# Patient Record
Sex: FEMALE | Race: WHITE | Employment: OTHER | URBAN - METROPOLITAN AREA
[De-identification: names, ages, dates, MRNs, and addresses within clinical notes are randomized per-mention and may not be internally consistent; named-entity substitution may affect disease eponyms.]

---

## 2023-10-02 ENCOUNTER — HOSPITAL ENCOUNTER (INPATIENT)
Age: 63
LOS: 7 days | Discharge: NURSING, REHAB OR LONG-TERM CARE | DRG: 045 | End: 2023-10-09
Attending: EMERGENCY MEDICINE | Admitting: FAMILY MEDICINE
Payer: MEDICAID

## 2023-10-02 ENCOUNTER — APPOINTMENT (OUTPATIENT)
Dept: CT IMAGING | Age: 63
DRG: 045 | End: 2023-10-02
Payer: MEDICAID

## 2023-10-02 ENCOUNTER — APPOINTMENT (OUTPATIENT)
Dept: GENERAL RADIOLOGY | Age: 63
DRG: 045 | End: 2023-10-02
Payer: MEDICAID

## 2023-10-02 DIAGNOSIS — R27.0 ATAXIA: Primary | ICD-10-CM

## 2023-10-02 LAB
ALBUMIN SERPL-MCNC: 3.7 G/DL (ref 3.5–5.2)
ALP SERPL-CCNC: 74 U/L (ref 35–104)
ALT SERPL-CCNC: 14 U/L (ref 0–32)
ANION GAP SERPL CALCULATED.3IONS-SCNC: 16 MMOL/L (ref 7–16)
AST SERPL-CCNC: 19 U/L (ref 0–31)
BASOPHILS # BLD: 0 K/UL (ref 0–0.2)
BASOPHILS NFR BLD: 0 % (ref 0–2)
BILIRUB SERPL-MCNC: 0.7 MG/DL (ref 0–1.2)
BUN SERPL-MCNC: 29 MG/DL (ref 6–23)
CALCIUM SERPL-MCNC: 9.2 MG/DL (ref 8.6–10.2)
CHLORIDE SERPL-SCNC: 103 MMOL/L (ref 98–107)
CO2 SERPL-SCNC: 22 MMOL/L (ref 22–29)
CREAT SERPL-MCNC: 1.6 MG/DL (ref 0.5–1)
EOSINOPHIL # BLD: 0.13 K/UL (ref 0.05–0.5)
EOSINOPHILS RELATIVE PERCENT: 1 % (ref 0–6)
ERYTHROCYTE [DISTWIDTH] IN BLOOD BY AUTOMATED COUNT: 12 % (ref 11.5–15)
GFR SERPL CREATININE-BSD FRML MDRD: 36 ML/MIN/1.73M2
GLUCOSE BLD-MCNC: 339 MG/DL (ref 74–99)
GLUCOSE BLD-MCNC: 342 MG/DL (ref 74–99)
GLUCOSE SERPL-MCNC: 334 MG/DL (ref 74–99)
HBA1C MFR BLD: 7.6 % (ref 4–5.6)
HCT VFR BLD AUTO: 37 % (ref 34–48)
HGB BLD-MCNC: 13.1 G/DL (ref 11.5–15.5)
INR PPP: 1.1
LYMPHOCYTES NFR BLD: 2.05 K/UL (ref 1.5–4)
LYMPHOCYTES RELATIVE PERCENT: 14 % (ref 20–42)
MCH RBC QN AUTO: 39.1 PG (ref 26–35)
MCHC RBC AUTO-ENTMCNC: 35.4 G/DL (ref 32–34.5)
MCV RBC AUTO: 110.4 FL (ref 80–99.9)
MONOCYTES NFR BLD: 0.64 K/UL (ref 0.1–0.95)
MONOCYTES NFR BLD: 4 % (ref 2–12)
NEUTROPHILS NFR BLD: 81 % (ref 43–80)
NEUTS SEG NFR BLD: 11.89 K/UL (ref 1.8–7.3)
PARTIAL THROMBOPLASTIN TIME: 25.1 SEC (ref 24.5–35.1)
PLATELET # BLD AUTO: 252 K/UL (ref 130–450)
PMV BLD AUTO: 11.6 FL (ref 7–12)
POTASSIUM SERPL-SCNC: 3.7 MMOL/L (ref 3.5–5)
PROT SERPL-MCNC: 6.4 G/DL (ref 6.4–8.3)
PROTHROMBIN TIME: 11.5 SEC (ref 9.3–12.4)
RBC # BLD AUTO: 3.35 M/UL (ref 3.5–5.5)
RBC # BLD: ABNORMAL 10*6/UL
SODIUM SERPL-SCNC: 141 MMOL/L (ref 132–146)
TROPONIN I SERPL HS-MCNC: 27 NG/L (ref 0–9)
WBC OTHER # BLD: 14.7 K/UL (ref 4.5–11.5)

## 2023-10-02 PROCEDURE — 70450 CT HEAD/BRAIN W/O DYE: CPT

## 2023-10-02 PROCEDURE — 2580000003 HC RX 258: Performed by: EMERGENCY MEDICINE

## 2023-10-02 PROCEDURE — 84484 ASSAY OF TROPONIN QUANT: CPT

## 2023-10-02 PROCEDURE — 6370000000 HC RX 637 (ALT 250 FOR IP): Performed by: FAMILY MEDICINE

## 2023-10-02 PROCEDURE — 99285 EMERGENCY DEPT VISIT HI MDM: CPT

## 2023-10-02 PROCEDURE — 2060000000 HC ICU INTERMEDIATE R&B

## 2023-10-02 PROCEDURE — 85610 PROTHROMBIN TIME: CPT

## 2023-10-02 PROCEDURE — 85025 COMPLETE CBC W/AUTO DIFF WBC: CPT

## 2023-10-02 PROCEDURE — 93005 ELECTROCARDIOGRAM TRACING: CPT | Performed by: EMERGENCY MEDICINE

## 2023-10-02 PROCEDURE — 80053 COMPREHEN METABOLIC PANEL: CPT

## 2023-10-02 PROCEDURE — 83036 HEMOGLOBIN GLYCOSYLATED A1C: CPT

## 2023-10-02 PROCEDURE — 85730 THROMBOPLASTIN TIME PARTIAL: CPT

## 2023-10-02 PROCEDURE — 71045 X-RAY EXAM CHEST 1 VIEW: CPT

## 2023-10-02 PROCEDURE — 82962 GLUCOSE BLOOD TEST: CPT

## 2023-10-02 RX ORDER — 0.9 % SODIUM CHLORIDE 0.9 %
1000 INTRAVENOUS SOLUTION INTRAVENOUS ONCE
Status: COMPLETED | OUTPATIENT
Start: 2023-10-02 | End: 2023-10-02

## 2023-10-02 RX ORDER — ONDANSETRON 4 MG/1
4 TABLET, ORALLY DISINTEGRATING ORAL EVERY 8 HOURS PRN
Status: DISCONTINUED | OUTPATIENT
Start: 2023-10-02 | End: 2023-10-09 | Stop reason: HOSPADM

## 2023-10-02 RX ORDER — DEXTROSE MONOHYDRATE 100 MG/ML
INJECTION, SOLUTION INTRAVENOUS CONTINUOUS PRN
Status: DISCONTINUED | OUTPATIENT
Start: 2023-10-02 | End: 2023-10-09 | Stop reason: HOSPADM

## 2023-10-02 RX ORDER — ASPIRIN 300 MG/1
300 SUPPOSITORY RECTAL DAILY
Status: DISCONTINUED | OUTPATIENT
Start: 2023-10-03 | End: 2023-10-09 | Stop reason: HOSPADM

## 2023-10-02 RX ORDER — INSULIN GLARGINE 100 [IU]/ML
15 INJECTION, SOLUTION SUBCUTANEOUS NIGHTLY
Status: DISCONTINUED | OUTPATIENT
Start: 2023-10-02 | End: 2023-10-04

## 2023-10-02 RX ORDER — INSULIN LISPRO 100 [IU]/ML
0-4 INJECTION, SOLUTION INTRAVENOUS; SUBCUTANEOUS NIGHTLY
Status: DISCONTINUED | OUTPATIENT
Start: 2023-10-02 | End: 2023-10-09 | Stop reason: HOSPADM

## 2023-10-02 RX ORDER — SODIUM CHLORIDE 9 MG/ML
INJECTION, SOLUTION INTRAVENOUS PRN
Status: DISCONTINUED | OUTPATIENT
Start: 2023-10-02 | End: 2023-10-09 | Stop reason: HOSPADM

## 2023-10-02 RX ORDER — POLYETHYLENE GLYCOL 3350 17 G/17G
17 POWDER, FOR SOLUTION ORAL DAILY PRN
Status: DISCONTINUED | OUTPATIENT
Start: 2023-10-02 | End: 2023-10-09 | Stop reason: HOSPADM

## 2023-10-02 RX ORDER — SODIUM CHLORIDE 9 MG/ML
INJECTION, SOLUTION INTRAVENOUS CONTINUOUS
Status: DISCONTINUED | OUTPATIENT
Start: 2023-10-02 | End: 2023-10-06

## 2023-10-02 RX ORDER — ROSUVASTATIN CALCIUM 20 MG/1
40 TABLET, COATED ORAL NIGHTLY
Status: DISCONTINUED | OUTPATIENT
Start: 2023-10-02 | End: 2023-10-09 | Stop reason: HOSPADM

## 2023-10-02 RX ORDER — ASPIRIN 81 MG/1
81 TABLET, CHEWABLE ORAL DAILY
Status: DISCONTINUED | OUTPATIENT
Start: 2023-10-03 | End: 2023-10-09 | Stop reason: HOSPADM

## 2023-10-02 RX ORDER — ENOXAPARIN SODIUM 100 MG/ML
40 INJECTION SUBCUTANEOUS DAILY
Status: DISCONTINUED | OUTPATIENT
Start: 2023-10-03 | End: 2023-10-09 | Stop reason: HOSPADM

## 2023-10-02 RX ORDER — ONDANSETRON 2 MG/ML
4 INJECTION INTRAMUSCULAR; INTRAVENOUS EVERY 6 HOURS PRN
Status: DISCONTINUED | OUTPATIENT
Start: 2023-10-02 | End: 2023-10-09 | Stop reason: HOSPADM

## 2023-10-02 RX ORDER — INSULIN LISPRO 100 [IU]/ML
0-8 INJECTION, SOLUTION INTRAVENOUS; SUBCUTANEOUS
Status: DISCONTINUED | OUTPATIENT
Start: 2023-10-03 | End: 2023-10-09 | Stop reason: HOSPADM

## 2023-10-02 RX ORDER — SODIUM CHLORIDE 0.9 % (FLUSH) 0.9 %
5-40 SYRINGE (ML) INJECTION EVERY 12 HOURS SCHEDULED
Status: DISCONTINUED | OUTPATIENT
Start: 2023-10-02 | End: 2023-10-09 | Stop reason: HOSPADM

## 2023-10-02 RX ORDER — SODIUM CHLORIDE 0.9 % (FLUSH) 0.9 %
5-40 SYRINGE (ML) INJECTION PRN
Status: DISCONTINUED | OUTPATIENT
Start: 2023-10-02 | End: 2023-10-09 | Stop reason: HOSPADM

## 2023-10-02 RX ORDER — SODIUM CHLORIDE 9 MG/ML
INJECTION, SOLUTION INTRAVENOUS CONTINUOUS
Status: DISCONTINUED | OUTPATIENT
Start: 2023-10-02 | End: 2023-10-02 | Stop reason: SDUPTHER

## 2023-10-02 RX ADMIN — SODIUM CHLORIDE: 9 INJECTION, SOLUTION INTRAVENOUS at 21:08

## 2023-10-02 RX ADMIN — SODIUM CHLORIDE 1000 ML: 9 INJECTION, SOLUTION INTRAVENOUS at 18:05

## 2023-10-02 RX ADMIN — ROSUVASTATIN 40 MG: 20 TABLET, FILM COATED ORAL at 23:05

## 2023-10-02 RX ADMIN — INSULIN LISPRO 4 UNITS: 100 INJECTION, SOLUTION INTRAVENOUS; SUBCUTANEOUS at 23:07

## 2023-10-02 ASSESSMENT — LIFESTYLE VARIABLES
HOW MANY STANDARD DRINKS CONTAINING ALCOHOL DO YOU HAVE ON A TYPICAL DAY: PATIENT DOES NOT DRINK
HOW OFTEN DO YOU HAVE A DRINK CONTAINING ALCOHOL: NEVER

## 2023-10-02 ASSESSMENT — PAIN - FUNCTIONAL ASSESSMENT: PAIN_FUNCTIONAL_ASSESSMENT: NONE - DENIES PAIN

## 2023-10-03 LAB
ANION GAP SERPL CALCULATED.3IONS-SCNC: 12 MMOL/L (ref 7–16)
BACTERIA URNS QL MICRO: ABNORMAL
BILIRUB UR QL STRIP: NEGATIVE
BUN SERPL-MCNC: 27 MG/DL (ref 6–23)
CALCIUM SERPL-MCNC: 8.8 MG/DL (ref 8.6–10.2)
CASTS #/AREA URNS LPF: ABNORMAL /LPF
CHLORIDE SERPL-SCNC: 104 MMOL/L (ref 98–107)
CHOLEST SERPL-MCNC: 198 MG/DL
CLARITY UR: ABNORMAL
CO2 SERPL-SCNC: 24 MMOL/L (ref 22–29)
COLOR UR: YELLOW
CREAT SERPL-MCNC: 1.2 MG/DL (ref 0.5–1)
EKG ATRIAL RATE: 61 BPM
EKG P AXIS: 33 DEGREES
EKG P-R INTERVAL: 158 MS
EKG Q-T INTERVAL: 460 MS
EKG QRS DURATION: 78 MS
EKG QTC CALCULATION (BAZETT): 463 MS
EKG R AXIS: -2 DEGREES
EKG T AXIS: 153 DEGREES
EKG VENTRICULAR RATE: 61 BPM
ERYTHROCYTE [DISTWIDTH] IN BLOOD BY AUTOMATED COUNT: 11.9 % (ref 11.5–15)
FOLATE SERPL-MCNC: >20 NG/ML (ref 4.8–24.2)
GFR SERPL CREATININE-BSD FRML MDRD: 50 ML/MIN/1.73M2
GLUCOSE BLD-MCNC: 198 MG/DL (ref 74–99)
GLUCOSE BLD-MCNC: 214 MG/DL (ref 74–99)
GLUCOSE BLD-MCNC: 214 MG/DL (ref 74–99)
GLUCOSE BLD-MCNC: 275 MG/DL (ref 74–99)
GLUCOSE SERPL-MCNC: 205 MG/DL (ref 74–99)
GLUCOSE UR STRIP-MCNC: 500 MG/DL
HBA1C MFR BLD: 7.6 % (ref 4–5.6)
HCT VFR BLD AUTO: 35.6 % (ref 34–48)
HDLC SERPL-MCNC: 29 MG/DL
HGB BLD-MCNC: 12.4 G/DL (ref 11.5–15.5)
HGB UR QL STRIP.AUTO: NEGATIVE
KETONES UR STRIP-MCNC: NEGATIVE MG/DL
LDLC SERPL CALC-MCNC: 90 MG/DL
LEUKOCYTE ESTERASE UR QL STRIP: NEGATIVE
MCH RBC QN AUTO: 38.9 PG (ref 26–35)
MCHC RBC AUTO-ENTMCNC: 34.8 G/DL (ref 32–34.5)
MCV RBC AUTO: 111.6 FL (ref 80–99.9)
NITRITE UR QL STRIP: NEGATIVE
PH UR STRIP: 5.5 [PH] (ref 5–9)
PLATELET # BLD AUTO: 190 K/UL (ref 130–450)
PMV BLD AUTO: 11 FL (ref 7–12)
POTASSIUM SERPL-SCNC: 3.5 MMOL/L (ref 3.5–5)
PROT UR STRIP-MCNC: ABNORMAL MG/DL
RBC # BLD AUTO: 3.19 M/UL (ref 3.5–5.5)
RBC #/AREA URNS HPF: ABNORMAL /HPF
SODIUM SERPL-SCNC: 140 MMOL/L (ref 132–146)
SP GR UR STRIP: >1.03 (ref 1–1.03)
TRIGL SERPL-MCNC: 397 MG/DL
TSH SERPL DL<=0.05 MIU/L-ACNC: 1.75 UIU/ML (ref 0.27–4.2)
UROBILINOGEN UR STRIP-ACNC: 0.2 EU/DL (ref 0–1)
VIT B12 SERPL-MCNC: <150 PG/ML (ref 211–946)
VLDLC SERPL CALC-MCNC: 79 MG/DL
WBC #/AREA URNS HPF: ABNORMAL /HPF
WBC OTHER # BLD: 11.5 K/UL (ref 4.5–11.5)

## 2023-10-03 PROCEDURE — 99222 1ST HOSP IP/OBS MODERATE 55: CPT | Performed by: PSYCHIATRY & NEUROLOGY

## 2023-10-03 PROCEDURE — 2580000003 HC RX 258: Performed by: STUDENT IN AN ORGANIZED HEALTH CARE EDUCATION/TRAINING PROGRAM

## 2023-10-03 PROCEDURE — 93010 ELECTROCARDIOGRAM REPORT: CPT | Performed by: INTERNAL MEDICINE

## 2023-10-03 PROCEDURE — 81001 URINALYSIS AUTO W/SCOPE: CPT

## 2023-10-03 PROCEDURE — 97161 PT EVAL LOW COMPLEX 20 MIN: CPT

## 2023-10-03 PROCEDURE — 83036 HEMOGLOBIN GLYCOSYLATED A1C: CPT

## 2023-10-03 PROCEDURE — 97530 THERAPEUTIC ACTIVITIES: CPT

## 2023-10-03 PROCEDURE — 6370000000 HC RX 637 (ALT 250 FOR IP): Performed by: FAMILY MEDICINE

## 2023-10-03 PROCEDURE — 82746 ASSAY OF FOLIC ACID SERUM: CPT

## 2023-10-03 PROCEDURE — 82962 GLUCOSE BLOOD TEST: CPT

## 2023-10-03 PROCEDURE — 80048 BASIC METABOLIC PNL TOTAL CA: CPT

## 2023-10-03 PROCEDURE — 97535 SELF CARE MNGMENT TRAINING: CPT

## 2023-10-03 PROCEDURE — 2580000003 HC RX 258: Performed by: FAMILY MEDICINE

## 2023-10-03 PROCEDURE — 2060000000 HC ICU INTERMEDIATE R&B

## 2023-10-03 PROCEDURE — 6360000002 HC RX W HCPCS: Performed by: STUDENT IN AN ORGANIZED HEALTH CARE EDUCATION/TRAINING PROGRAM

## 2023-10-03 PROCEDURE — 82607 VITAMIN B-12: CPT

## 2023-10-03 PROCEDURE — 85027 COMPLETE CBC AUTOMATED: CPT

## 2023-10-03 PROCEDURE — 93306 TTE W/DOPPLER COMPLETE: CPT

## 2023-10-03 PROCEDURE — 80061 LIPID PANEL: CPT

## 2023-10-03 PROCEDURE — 36415 COLL VENOUS BLD VENIPUNCTURE: CPT

## 2023-10-03 PROCEDURE — 97165 OT EVAL LOW COMPLEX 30 MIN: CPT

## 2023-10-03 PROCEDURE — 6370000000 HC RX 637 (ALT 250 FOR IP): Performed by: STUDENT IN AN ORGANIZED HEALTH CARE EDUCATION/TRAINING PROGRAM

## 2023-10-03 PROCEDURE — 84443 ASSAY THYROID STIM HORMONE: CPT

## 2023-10-03 PROCEDURE — 6360000002 HC RX W HCPCS: Performed by: FAMILY MEDICINE

## 2023-10-03 RX ORDER — HYDRALAZINE HYDROCHLORIDE 20 MG/ML
10 INJECTION INTRAMUSCULAR; INTRAVENOUS EVERY 4 HOURS PRN
Status: DISCONTINUED | OUTPATIENT
Start: 2023-10-03 | End: 2023-10-09 | Stop reason: HOSPADM

## 2023-10-03 RX ORDER — ACETAMINOPHEN 325 MG/1
650 TABLET ORAL EVERY 6 HOURS PRN
COMMUNITY

## 2023-10-03 RX ORDER — LOSARTAN POTASSIUM 25 MG/1
25 TABLET ORAL DAILY
Status: DISCONTINUED | OUTPATIENT
Start: 2023-10-03 | End: 2023-10-06

## 2023-10-03 RX ORDER — INSULIN LISPRO 100 [IU]/ML
5 INJECTION, SOLUTION INTRAVENOUS; SUBCUTANEOUS
Status: DISCONTINUED | OUTPATIENT
Start: 2023-10-03 | End: 2023-10-04

## 2023-10-03 RX ORDER — LABETALOL HYDROCHLORIDE 5 MG/ML
10 INJECTION, SOLUTION INTRAVENOUS EVERY 4 HOURS
Status: DISCONTINUED | OUTPATIENT
Start: 2023-10-03 | End: 2023-10-09

## 2023-10-03 RX ADMIN — LOSARTAN POTASSIUM 25 MG: 50 TABLET, FILM COATED ORAL at 08:58

## 2023-10-03 RX ADMIN — ASPIRIN 81 MG: 81 TABLET, CHEWABLE ORAL at 07:56

## 2023-10-03 RX ADMIN — INSULIN LISPRO 5 UNITS: 100 INJECTION, SOLUTION INTRAVENOUS; SUBCUTANEOUS at 18:44

## 2023-10-03 RX ADMIN — LABETALOL HYDROCHLORIDE 10 MG: 5 INJECTION INTRAVENOUS at 08:53

## 2023-10-03 RX ADMIN — ROSUVASTATIN 40 MG: 20 TABLET, FILM COATED ORAL at 20:31

## 2023-10-03 RX ADMIN — INSULIN LISPRO 2 UNITS: 100 INJECTION, SOLUTION INTRAVENOUS; SUBCUTANEOUS at 11:16

## 2023-10-03 RX ADMIN — SODIUM CHLORIDE: 9 INJECTION, SOLUTION INTRAVENOUS at 00:00

## 2023-10-03 RX ADMIN — SODIUM CHLORIDE: 9 INJECTION, SOLUTION INTRAVENOUS at 19:13

## 2023-10-03 RX ADMIN — ENOXAPARIN SODIUM 40 MG: 100 INJECTION SUBCUTANEOUS at 07:56

## 2023-10-03 RX ADMIN — HYDRALAZINE HYDROCHLORIDE 10 MG: 20 INJECTION, SOLUTION INTRAMUSCULAR; INTRAVENOUS at 06:47

## 2023-10-03 RX ADMIN — INSULIN LISPRO 5 UNITS: 100 INJECTION, SOLUTION INTRAVENOUS; SUBCUTANEOUS at 11:17

## 2023-10-03 RX ADMIN — WATER 1000 MG: 1 INJECTION INTRAMUSCULAR; INTRAVENOUS; SUBCUTANEOUS at 08:53

## 2023-10-03 RX ADMIN — INSULIN LISPRO 5 UNITS: 100 INJECTION, SOLUTION INTRAVENOUS; SUBCUTANEOUS at 08:53

## 2023-10-03 RX ADMIN — SODIUM CHLORIDE: 9 INJECTION, SOLUTION INTRAVENOUS at 07:56

## 2023-10-03 RX ADMIN — INSULIN GLARGINE 15 UNITS: 100 INJECTION, SOLUTION SUBCUTANEOUS at 20:31

## 2023-10-03 RX ADMIN — INSULIN LISPRO 2 UNITS: 100 INJECTION, SOLUTION INTRAVENOUS; SUBCUTANEOUS at 07:56

## 2023-10-03 ASSESSMENT — PAIN SCALES - GENERAL
PAINLEVEL_OUTOF10: 0

## 2023-10-03 NOTE — H&P
History & Physical      PCP: No primary care provider on file. Date of Admission: 10/2/2023    Date of Service: Pt seen/examined on 10/2/2023 and is     admitted to Inpatient with expected LOS greater than two midnights due to medical therapy. Placed in Observation. Chief Complaint:  had concerns including Extremity Weakness (BLE weakness, gait disturbances x 2 months. Family states she is also slower to answer questions for 1 month. Patient A&O x 3 in triage. ). History Of Present Illness:    Ms. Julián Gramajo, a 65y.o. year old female  who  has no past medical history on file. Presented with c/o lower extremity weakness and gait instability . Symptoms are presented for over couple of months and progressively worsening. Family reports pt is slow to answer questions. ED work up today showed   CT head   There is no acute intracranial hemorrhage or acute intracranial abnormality  2. Old lacunar infarct within the lateral aspect of the left basal ganglia  3. Old lacunar infarct within the left thalamus. Past Medical History:    History reviewed. No pertinent past medical history. Past Surgical History:    History reviewed. No pertinent surgical history. Medications Prior to Admission:      Prior to Admission medications    Medication Sig Start Date End Date Taking? Authorizing Provider   acetaminophen (TYLENOL) 325 MG tablet Take 2 tablets by mouth every 6 hours as needed for Pain   Yes Historical Provider, MD       Allergies:  Patient has no known allergies. Social History:    TOBACCO:   reports that she has never smoked. She has never used smokeless tobacco.  ETOH:   reports no history of alcohol use. Family History:    Reviewed in detail and negative for DM, CAD, Cancer, CVA. Positive as follows\"  History reviewed. No pertinent family history. REVIEW OF SYSTEMS:   Pertinent positives as noted in the HPI. All other systems reviewed and negative.     PHYSICAL EXAM:  BP (!)

## 2023-10-03 NOTE — CARE COORDINATION
Patient presented to ED with c/o bilateral weakness and ataxia x 2 months. She also has been slower to answer questions. Abnormal urinalysis  IV Rocephin started. bs 342 /117 199/99  IV labetalol ordered. Neurology consulted. Echo completed. For MRI. Met with patient at bedside  to discuss transition of care. She lives with her daughter, Claudio Grayson and uses [address] [place] [address] in [place]. This CM could not continue assessment as patient kept dosing . Attempted to call her daughter but no answer so left my number for her to contact She returned call pior to therapy notes being entered. She states at this time when  would like her mother to discharge home as they have a trip planned. PT 13/24 [facility]   OT 15/24. Left message for daughter to return call to discuss therapy recommendations.  Spoke with daughter per phone and Emailed her a Vashti.

## 2023-10-03 NOTE — ED NOTES
Patient aware the need for a urine sample. States she cannot go at this time.       [address] [place], [place]  10/03/23 8219

## 2023-10-03 NOTE — ED NOTES
Wheeled patient to restroom to provide urine sample.       [address] [place], [place]  10/03/23 4156

## 2023-10-03 NOTE — ED NOTES
MRI screening done.      [address] [facility] [place]  10/02/23 9884

## 2023-10-03 NOTE — ED NOTES
This nurse offered to take patient to restroom. Patient states she cannot go at this time.       [address] [place], [place]  10/03/23 036

## 2023-10-04 ENCOUNTER — APPOINTMENT (OUTPATIENT)
Dept: MRI IMAGING | Age: 63
DRG: 045 | End: 2023-10-04
Payer: MEDICAID

## 2023-10-04 ENCOUNTER — APPOINTMENT (OUTPATIENT)
Dept: CT IMAGING | Age: 63
DRG: 045 | End: 2023-10-04
Payer: MEDICAID

## 2023-10-04 PROBLEM — E11.65 UNCONTROLLED TYPE 2 DIABETES MELLITUS WITH HYPERGLYCEMIA (HCC): Status: ACTIVE | Noted: 2023-10-04

## 2023-10-04 PROBLEM — E78.2 MIXED HYPERLIPIDEMIA: Status: ACTIVE | Noted: 2023-10-04

## 2023-10-04 PROBLEM — I10 UNCONTROLLED HYPERTENSION: Status: ACTIVE | Noted: 2023-10-04

## 2023-10-04 PROBLEM — R41.841 COGNITIVE COMMUNICATION DEFICIT: Status: ACTIVE | Noted: 2023-10-04

## 2023-10-04 PROBLEM — I63.9 ACUTE STROKE DUE TO ISCHEMIA (HCC): Status: ACTIVE | Noted: 2023-10-04

## 2023-10-04 LAB
ANION GAP SERPL CALCULATED.3IONS-SCNC: 12 MMOL/L (ref 7–16)
BUN SERPL-MCNC: 13 MG/DL (ref 6–23)
CALCIUM SERPL-MCNC: 8.4 MG/DL (ref 8.6–10.2)
CHLORIDE SERPL-SCNC: 109 MMOL/L (ref 98–107)
CO2 SERPL-SCNC: 22 MMOL/L (ref 22–29)
CREAT SERPL-MCNC: 1 MG/DL (ref 0.5–1)
ERYTHROCYTE [DISTWIDTH] IN BLOOD BY AUTOMATED COUNT: 11.9 % (ref 11.5–15)
GFR SERPL CREATININE-BSD FRML MDRD: >60 ML/MIN/1.73M2
GLUCOSE BLD-MCNC: 169 MG/DL (ref 74–99)
GLUCOSE BLD-MCNC: 186 MG/DL (ref 74–99)
GLUCOSE BLD-MCNC: 203 MG/DL (ref 74–99)
GLUCOSE BLD-MCNC: 224 MG/DL (ref 74–99)
GLUCOSE BLD-MCNC: 89 MG/DL (ref 74–99)
GLUCOSE SERPL-MCNC: 227 MG/DL (ref 74–99)
HCT VFR BLD AUTO: 31.9 % (ref 34–48)
HGB BLD-MCNC: 11.1 G/DL (ref 11.5–15.5)
MCH RBC QN AUTO: 38.8 PG (ref 26–35)
MCHC RBC AUTO-ENTMCNC: 34.8 G/DL (ref 32–34.5)
MCV RBC AUTO: 111.5 FL (ref 80–99.9)
PLATELET # BLD AUTO: 146 K/UL (ref 130–450)
PMV BLD AUTO: 11.1 FL (ref 7–12)
POTASSIUM SERPL-SCNC: 3.3 MMOL/L (ref 3.5–5)
RBC # BLD AUTO: 2.86 M/UL (ref 3.5–5.5)
SODIUM SERPL-SCNC: 143 MMOL/L (ref 132–146)
WBC OTHER # BLD: 8.7 K/UL (ref 4.5–11.5)

## 2023-10-04 PROCEDURE — 2580000003 HC RX 258: Performed by: STUDENT IN AN ORGANIZED HEALTH CARE EDUCATION/TRAINING PROGRAM

## 2023-10-04 PROCEDURE — APPSS60 APP SPLIT SHARED TIME 46-60 MINUTES: Performed by: NURSE PRACTITIONER

## 2023-10-04 PROCEDURE — 70498 CT ANGIOGRAPHY NECK: CPT

## 2023-10-04 PROCEDURE — 2060000000 HC ICU INTERMEDIATE R&B

## 2023-10-04 PROCEDURE — 6360000002 HC RX W HCPCS: Performed by: STUDENT IN AN ORGANIZED HEALTH CARE EDUCATION/TRAINING PROGRAM

## 2023-10-04 PROCEDURE — 99233 SBSQ HOSP IP/OBS HIGH 50: CPT | Performed by: NURSE PRACTITIONER

## 2023-10-04 PROCEDURE — 36415 COLL VENOUS BLD VENIPUNCTURE: CPT

## 2023-10-04 PROCEDURE — 2580000003 HC RX 258: Performed by: FAMILY MEDICINE

## 2023-10-04 PROCEDURE — 6360000004 HC RX CONTRAST MEDICATION: Performed by: RADIOLOGY

## 2023-10-04 PROCEDURE — 70496 CT ANGIOGRAPHY HEAD: CPT

## 2023-10-04 PROCEDURE — 6370000000 HC RX 637 (ALT 250 FOR IP): Performed by: STUDENT IN AN ORGANIZED HEALTH CARE EDUCATION/TRAINING PROGRAM

## 2023-10-04 PROCEDURE — 70551 MRI BRAIN STEM W/O DYE: CPT

## 2023-10-04 PROCEDURE — 99255 IP/OBS CONSLTJ NEW/EST HI 80: CPT | Performed by: INTERNAL MEDICINE

## 2023-10-04 PROCEDURE — 80048 BASIC METABOLIC PNL TOTAL CA: CPT

## 2023-10-04 PROCEDURE — 82962 GLUCOSE BLOOD TEST: CPT

## 2023-10-04 PROCEDURE — 85027 COMPLETE CBC AUTOMATED: CPT

## 2023-10-04 PROCEDURE — 6370000000 HC RX 637 (ALT 250 FOR IP): Performed by: FAMILY MEDICINE

## 2023-10-04 RX ORDER — AMLODIPINE BESYLATE 5 MG/1
5 TABLET ORAL DAILY
Status: DISCONTINUED | OUTPATIENT
Start: 2023-10-04 | End: 2023-10-09 | Stop reason: HOSPADM

## 2023-10-04 RX ORDER — POTASSIUM CHLORIDE 20 MEQ/1
40 TABLET, EXTENDED RELEASE ORAL ONCE
Status: COMPLETED | OUTPATIENT
Start: 2023-10-04 | End: 2023-10-04

## 2023-10-04 RX ORDER — CYANOCOBALAMIN 1000 UG/ML
1000 INJECTION, SOLUTION INTRAMUSCULAR; SUBCUTANEOUS ONCE
Status: COMPLETED | OUTPATIENT
Start: 2023-10-04 | End: 2023-10-04

## 2023-10-04 RX ORDER — INSULIN GLARGINE 100 [IU]/ML
20 INJECTION, SOLUTION SUBCUTANEOUS NIGHTLY
Status: DISCONTINUED | OUTPATIENT
Start: 2023-10-04 | End: 2023-10-09 | Stop reason: HOSPADM

## 2023-10-04 RX ORDER — INSULIN LISPRO 100 [IU]/ML
7 INJECTION, SOLUTION INTRAVENOUS; SUBCUTANEOUS
Status: DISCONTINUED | OUTPATIENT
Start: 2023-10-04 | End: 2023-10-05

## 2023-10-04 RX ORDER — CYANOCOBALAMIN 1000 UG/ML
1000 INJECTION, SOLUTION INTRAMUSCULAR; SUBCUTANEOUS ONCE
Status: DISCONTINUED | OUTPATIENT
Start: 2023-10-04 | End: 2023-10-04

## 2023-10-04 RX ORDER — INSULIN LISPRO 100 [IU]/ML
5 INJECTION, SOLUTION INTRAVENOUS; SUBCUTANEOUS ONCE
Status: COMPLETED | OUTPATIENT
Start: 2023-10-04 | End: 2023-10-04

## 2023-10-04 RX ORDER — LANOLIN ALCOHOL/MO/W.PET/CERES
1000 CREAM (GRAM) TOPICAL DAILY
Status: DISCONTINUED | OUTPATIENT
Start: 2023-10-04 | End: 2023-10-09 | Stop reason: HOSPADM

## 2023-10-04 RX ADMIN — CYANOCOBALAMIN 1000 MCG: 1000 INJECTION, SOLUTION INTRAMUSCULAR; SUBCUTANEOUS at 15:13

## 2023-10-04 RX ADMIN — LABETALOL HYDROCHLORIDE 10 MG: 5 INJECTION INTRAVENOUS at 09:21

## 2023-10-04 RX ADMIN — INSULIN LISPRO 2 UNITS: 100 INJECTION, SOLUTION INTRAVENOUS; SUBCUTANEOUS at 15:12

## 2023-10-04 RX ADMIN — IOPAMIDOL 60 ML: 755 INJECTION, SOLUTION INTRAVENOUS at 13:51

## 2023-10-04 RX ADMIN — ANORECTAL OINTMENT: 15.7; .44; 24; 20.6 OINTMENT TOPICAL at 21:15

## 2023-10-04 RX ADMIN — ANORECTAL OINTMENT: 15.7; .44; 24; 20.6 OINTMENT TOPICAL at 15:18

## 2023-10-04 RX ADMIN — INSULIN LISPRO 7 UNITS: 100 INJECTION, SOLUTION INTRAVENOUS; SUBCUTANEOUS at 15:11

## 2023-10-04 RX ADMIN — ROSUVASTATIN 40 MG: 20 TABLET, FILM COATED ORAL at 21:15

## 2023-10-04 RX ADMIN — INSULIN LISPRO 2 UNITS: 100 INJECTION, SOLUTION INTRAVENOUS; SUBCUTANEOUS at 09:19

## 2023-10-04 RX ADMIN — SODIUM CHLORIDE: 9 INJECTION, SOLUTION INTRAVENOUS at 22:17

## 2023-10-04 RX ADMIN — SODIUM CHLORIDE: 9 INJECTION, SOLUTION INTRAVENOUS at 04:21

## 2023-10-04 RX ADMIN — LOSARTAN POTASSIUM 25 MG: 50 TABLET, FILM COATED ORAL at 08:54

## 2023-10-04 RX ADMIN — LABETALOL HYDROCHLORIDE 10 MG: 5 INJECTION INTRAVENOUS at 00:37

## 2023-10-04 RX ADMIN — INSULIN LISPRO 5 UNITS: 100 INJECTION, SOLUTION INTRAVENOUS; SUBCUTANEOUS at 09:19

## 2023-10-04 RX ADMIN — WATER 1000 MG: 1 INJECTION INTRAMUSCULAR; INTRAVENOUS; SUBCUTANEOUS at 08:54

## 2023-10-04 RX ADMIN — LABETALOL HYDROCHLORIDE 10 MG: 5 INJECTION INTRAVENOUS at 04:20

## 2023-10-04 RX ADMIN — INSULIN LISPRO 7 UNITS: 100 INJECTION, SOLUTION INTRAVENOUS; SUBCUTANEOUS at 18:19

## 2023-10-04 RX ADMIN — AMLODIPINE BESYLATE 5 MG: 5 TABLET ORAL at 15:16

## 2023-10-04 RX ADMIN — POTASSIUM CHLORIDE 40 MEQ: 1500 TABLET, EXTENDED RELEASE ORAL at 15:20

## 2023-10-04 RX ADMIN — SODIUM CHLORIDE, PRESERVATIVE FREE 10 ML: 5 INJECTION INTRAVENOUS at 21:15

## 2023-10-04 NOTE — FLOWSHEET NOTE
Inpatient Wound Care (Initial consult) 8507A    Admit Date: 10/2/2023  2:02 PM    Reason for consult:  Coccyx    Significant history:  Per H&P    Chief Complaint:  had concerns including Extremity Weakness (BLE weakness, gait disturbances x 2 months. Family states she is also slower to answer questions for 1 month. Patient A&O x 3 in triage. ).     Findings:     10/04/23 1002   Skin Integumentary    Skin Integrity   (dry flaky)   Location bilateral feet   Skin Integrity Site 2   Skin Integrity Location 2 Ecchymosis   Location 2 BUE   Wound 10/03/23 Coccyx Proximal   Date First Assessed/Time First Assessed: 10/03/23 0351   Present on Original Admission: Yes  Location: Coccyx  Wound Location Orientation: Proximal   Wound Image   (see photo for coccyx)   Dressing/Treatment Pharmaceutical agent (see MAR)   Wound Length (cm) 0.8 cm   Wound Width (cm) 0.6 cm   Wound Depth (cm) 0.1 cm   Wound Surface Area (cm^2) 0.48 cm^2   Change in Wound Size % (l*w) 84   Wound Volume (cm^3) 0.048 cm^3   Wound Healing % 84   Wound Assessment Pink/red   Drainage Amount Scant (moist but unmeasurable)   Drainage Description Clear   Odor None   Gabi-wound Assessment Dry/flaky   Wound Thickness Description not for Pressure Injury Partial thickness   Wound 10/04/23 Coccyx Distal   Date First Assessed/Time First Assessed: 10/04/23 1022   Present on Original Admission: Yes  Location: Coccyx  Wound Location Orientation: Distal   Wound Image    Dressing/Treatment Pharmaceutical agent (see MAR)   Wound Length (cm) 1.4 cm   Wound Width (cm) 1 cm   Wound Depth (cm) 0.1 cm   Wound Surface Area (cm^2) 1.4 cm^2   Wound Volume (cm^3) 0.14 cm^3   Wound Assessment Pink/red   Drainage Amount Scant (moist but unmeasurable)   Drainage Description Clear   Odor None   Gabi-wound Assessment Dry/flaky   Wound Thickness Description not for Pressure Injury Partial thickness       **Informed Consent**    The patient has given verbal consent to have photos taken of

## 2023-10-04 NOTE — CARE COORDINATION
Here for Extremity Weakness (BLE weakness, gait disturbances x 2 months. Family states she is also slower to answer questions for 1 month. PT 15 OT 15  Neurology consult MRI- Acute to subacute infarcts involving the right cerebral peduncle as well as the paramedian frontal lobes bilaterally. No significant mass effect or midline shift. Cardiology -Plan for Linq 11/5/2023 Dr Chava Norman notified Placed on CVL schedule. Attempted to speak with pt and she just looked and did not respond but when I said I would call her daughter she nodded her head. Call placed to daughter Dionne Mcgill - await return call. Discharge plan to be determined. Cm/sw to follow. Electronically signed by Gregory Saeed RN on 10/4/2023 at 12:56 PM

## 2023-10-05 ENCOUNTER — TELEPHONE (OUTPATIENT)
Dept: CARDIOLOGY CLINIC | Age: 63
End: 2023-10-05

## 2023-10-05 ENCOUNTER — APPOINTMENT (OUTPATIENT)
Dept: CARDIAC CATH/INVASIVE PROCEDURES | Age: 63
DRG: 045 | End: 2023-10-05
Payer: MEDICAID

## 2023-10-05 DIAGNOSIS — I51.7 LVH (LEFT VENTRICULAR HYPERTROPHY): Primary | ICD-10-CM

## 2023-10-05 DIAGNOSIS — Q20.8 ABNORMALITY OF LEFT VENTRICULAR OUTFLOW TRACT: ICD-10-CM

## 2023-10-05 LAB
ANION GAP SERPL CALCULATED.3IONS-SCNC: 11 MMOL/L (ref 7–16)
BUN SERPL-MCNC: 8 MG/DL (ref 6–23)
CALCIUM SERPL-MCNC: 8.8 MG/DL (ref 8.6–10.2)
CHLORIDE SERPL-SCNC: 107 MMOL/L (ref 98–107)
CO2 SERPL-SCNC: 23 MMOL/L (ref 22–29)
CREAT SERPL-MCNC: 1 MG/DL (ref 0.5–1)
ERYTHROCYTE [DISTWIDTH] IN BLOOD BY AUTOMATED COUNT: 12 % (ref 11.5–15)
GFR SERPL CREATININE-BSD FRML MDRD: >60 ML/MIN/1.73M2
GLUCOSE BLD-MCNC: 203 MG/DL (ref 74–99)
GLUCOSE BLD-MCNC: 223 MG/DL (ref 74–99)
GLUCOSE BLD-MCNC: 271 MG/DL (ref 74–99)
GLUCOSE BLD-MCNC: 292 MG/DL (ref 74–99)
GLUCOSE SERPL-MCNC: 215 MG/DL (ref 74–99)
HCT VFR BLD AUTO: 31.8 % (ref 34–48)
HGB BLD-MCNC: 10.9 G/DL (ref 11.5–15.5)
MCH RBC QN AUTO: 38.7 PG (ref 26–35)
MCHC RBC AUTO-ENTMCNC: 34.3 G/DL (ref 32–34.5)
MCV RBC AUTO: 112.8 FL (ref 80–99.9)
PLATELET # BLD AUTO: 147 K/UL (ref 130–450)
PMV BLD AUTO: 11.2 FL (ref 7–12)
POTASSIUM SERPL-SCNC: 3.4 MMOL/L (ref 3.5–5)
RBC # BLD AUTO: 2.82 M/UL (ref 3.5–5.5)
SODIUM SERPL-SCNC: 141 MMOL/L (ref 132–146)
WBC OTHER # BLD: 7.6 K/UL (ref 4.5–11.5)

## 2023-10-05 PROCEDURE — 33285 INSJ SUBQ CAR RHYTHM MNTR: CPT | Performed by: INTERNAL MEDICINE

## 2023-10-05 PROCEDURE — 33285 INSJ SUBQ CAR RHYTHM MNTR: CPT

## 2023-10-05 PROCEDURE — 92523 SPEECH SOUND LANG COMPREHEN: CPT | Performed by: SPEECH-LANGUAGE PATHOLOGIST

## 2023-10-05 PROCEDURE — 2580000003 HC RX 258: Performed by: STUDENT IN AN ORGANIZED HEALTH CARE EDUCATION/TRAINING PROGRAM

## 2023-10-05 PROCEDURE — 0JH632Z INSERTION OF MONITORING DEVICE INTO CHEST SUBCUTANEOUS TISSUE AND FASCIA, PERCUTANEOUS APPROACH: ICD-10-PCS | Performed by: INTERNAL MEDICINE

## 2023-10-05 PROCEDURE — 97129 THER IVNTJ 1ST 15 MIN: CPT | Performed by: SPEECH-LANGUAGE PATHOLOGIST

## 2023-10-05 PROCEDURE — 6360000002 HC RX W HCPCS: Performed by: STUDENT IN AN ORGANIZED HEALTH CARE EDUCATION/TRAINING PROGRAM

## 2023-10-05 PROCEDURE — 6370000000 HC RX 637 (ALT 250 FOR IP): Performed by: FAMILY MEDICINE

## 2023-10-05 PROCEDURE — 82962 GLUCOSE BLOOD TEST: CPT

## 2023-10-05 PROCEDURE — 6370000000 HC RX 637 (ALT 250 FOR IP): Performed by: NURSE PRACTITIONER

## 2023-10-05 PROCEDURE — 6370000000 HC RX 637 (ALT 250 FOR IP): Performed by: STUDENT IN AN ORGANIZED HEALTH CARE EDUCATION/TRAINING PROGRAM

## 2023-10-05 PROCEDURE — 99232 SBSQ HOSP IP/OBS MODERATE 35: CPT | Performed by: INTERNAL MEDICINE

## 2023-10-05 PROCEDURE — C1764 EVENT RECORDER, CARDIAC: HCPCS

## 2023-10-05 PROCEDURE — 2580000003 HC RX 258: Performed by: FAMILY MEDICINE

## 2023-10-05 PROCEDURE — 80048 BASIC METABOLIC PNL TOTAL CA: CPT

## 2023-10-05 PROCEDURE — 36415 COLL VENOUS BLD VENIPUNCTURE: CPT

## 2023-10-05 PROCEDURE — 2060000000 HC ICU INTERMEDIATE R&B

## 2023-10-05 PROCEDURE — 85027 COMPLETE CBC AUTOMATED: CPT

## 2023-10-05 RX ORDER — INSULIN LISPRO 100 [IU]/ML
5 INJECTION, SOLUTION INTRAVENOUS; SUBCUTANEOUS
Status: DISCONTINUED | OUTPATIENT
Start: 2023-10-05 | End: 2023-10-09 | Stop reason: HOSPADM

## 2023-10-05 RX ADMIN — INSULIN LISPRO 2 UNITS: 100 INJECTION, SOLUTION INTRAVENOUS; SUBCUTANEOUS at 12:33

## 2023-10-05 RX ADMIN — INSULIN LISPRO 5 UNITS: 100 INJECTION, SOLUTION INTRAVENOUS; SUBCUTANEOUS at 12:32

## 2023-10-05 RX ADMIN — ANORECTAL OINTMENT: 15.7; .44; 24; 20.6 OINTMENT TOPICAL at 12:29

## 2023-10-05 RX ADMIN — INSULIN LISPRO 4 UNITS: 100 INJECTION, SOLUTION INTRAVENOUS; SUBCUTANEOUS at 18:07

## 2023-10-05 RX ADMIN — LABETALOL HYDROCHLORIDE 10 MG: 5 INJECTION INTRAVENOUS at 00:16

## 2023-10-05 RX ADMIN — WATER 1000 MG: 1 INJECTION INTRAMUSCULAR; INTRAVENOUS; SUBCUTANEOUS at 10:03

## 2023-10-05 RX ADMIN — POTASSIUM BICARBONATE 40 MEQ: 782 TABLET, EFFERVESCENT ORAL at 12:28

## 2023-10-05 RX ADMIN — CYANOCOBALAMIN TAB 1000 MCG 1000 MCG: 1000 TAB at 12:27

## 2023-10-05 RX ADMIN — LABETALOL HYDROCHLORIDE 10 MG: 5 INJECTION INTRAVENOUS at 04:50

## 2023-10-05 RX ADMIN — AMLODIPINE BESYLATE 5 MG: 5 TABLET ORAL at 12:27

## 2023-10-05 RX ADMIN — INSULIN GLARGINE 20 UNITS: 100 INJECTION, SOLUTION SUBCUTANEOUS at 20:57

## 2023-10-05 RX ADMIN — LABETALOL HYDROCHLORIDE 10 MG: 5 INJECTION INTRAVENOUS at 13:42

## 2023-10-05 RX ADMIN — ROSUVASTATIN 40 MG: 20 TABLET, FILM COATED ORAL at 20:57

## 2023-10-05 RX ADMIN — SODIUM CHLORIDE: 9 INJECTION, SOLUTION INTRAVENOUS at 12:27

## 2023-10-05 RX ADMIN — LOSARTAN POTASSIUM 25 MG: 50 TABLET, FILM COATED ORAL at 12:28

## 2023-10-05 RX ADMIN — SODIUM CHLORIDE, PRESERVATIVE FREE 10 ML: 5 INJECTION INTRAVENOUS at 12:23

## 2023-10-05 RX ADMIN — INSULIN LISPRO 5 UNITS: 100 INJECTION, SOLUTION INTRAVENOUS; SUBCUTANEOUS at 18:06

## 2023-10-05 RX ADMIN — ANORECTAL OINTMENT: 15.7; .44; 24; 20.6 OINTMENT TOPICAL at 20:58

## 2023-10-05 NOTE — OP NOTE
[facility]) Physicians- The Heart and [address] [place] [address] Electrophysiology  Procedure Report  PATIENT: Beatris Ham  MEDICAL RECORD NUMBER: [id number]  DATE OF PROCEDURE:  10/5/2023  ATTENDING ELECTROPHYSIOLOGIST:  Gee Prakash MD  REFERRING PHYSICIAN: Dr. Tubbs Glenn:   1. Reveal LINQ II Insertable Cardiac Monitor Implantation     INDICATION:   Cryptogenic CVA    PROCEDURE PERFORMED BY: Gee Prakash MD    ASSISTANT: None     ESTIMATED BLOOD LOSS: Approximately <10 cc     PROCEDURE TIME: 15 minutes    COMPLICATIONS: None immediately apparent    DESCRIPTION OF PROCEDURE: The risks, benefits, and alternatives to the procedure were discussed with the patient, and informed consent was obtained. The patient was brought to the Electrophysiology lab and after postioning the patient and prepping and draping a sterile field, the region lateral to the sternum, was liberally infiltrated with 2% Lidocaine. Using the skin blade, a small nick was made. Using the introducer kit, the device was deployed into the subcutaneous space. Hemostasis was achieved with brief manual pressure. The incision was closed in 1 layer including using 4-0 Monocryl. The wound was dressed with steri-strips and an op site dressing. Excellent P wave and QRS sensing were obtained. The patient was transferred to the recovery room in stable condition. DEVICE INFORMATION: The ICM is a Medtronic Linq II, model #: [id number], serial #: [id number]. SUMMARY:   1. Successful implantation of a  Reveal LINQ II insertable cardiac monitor. RECOMMENDATIONS:   1. Follow-up in the office in 2 weeks for a postoperative incision check.   See discharge instructions for details    Gee Prakash MD  Cardiac Electrophysiology  [facility]) Physicians  The Heart and Vascular Holder: [facility]  10:36 AM  10/5/2023

## 2023-10-05 NOTE — TELEPHONE ENCOUNTER
Cardiac MRI scheduled at [facility] 10/26/23 at 7:30 am with 7:00am arrival. Left message for daughter Yandel Thomas with all the information and to please call back.

## 2023-10-05 NOTE — TELEPHONE ENCOUNTER
----- Message from [place]. YANG Orellana sent at 10/4/2023 11:50 AM EDT -----  New patient for Dr Stefanie Stephen  Needs outpatient Cardiac MRI to rule out CMP versus infiltrative process.    Diagnosis LVH and LVOT obstruction  Thanks

## 2023-10-05 NOTE — CARE COORDINATION
Here for Here for Extremity Weakness (BLE weakness, gait disturbances x 2 months. Family states she is also slower to answer questions for 1 month. MRI- Acute to subacute infarcts involving the right cerebral peduncle as well as the paramedian frontal lobes bilaterally. No significant mass effect or midline shift. Neurology and cardiology consult. CTA head -Normal CTA of the brain except for minor anatomic variation- CTA  neck - negative. Cardiology - for linq monitor today. PT 13 OT 15   Call from daughter Claudio Grayson today with snf choices 1 [facility] [place] no beds 2. [facility]  per [facility] no beds 3. [facility]  no beds. Call placed to daughter Claudio Grayson and left - await return call. Electronically signed by Naila Loza RN on 10/5/2023 at 3:32 PM    Asked for pt/ot for updates for tomorrow. Electronically signed by Naila Loza RN on 10/5/2023 at 3:38 PM

## 2023-10-06 LAB
ANION GAP SERPL CALCULATED.3IONS-SCNC: 11 MMOL/L (ref 7–16)
BUN SERPL-MCNC: 7 MG/DL (ref 6–23)
CALCIUM SERPL-MCNC: 9.3 MG/DL (ref 8.6–10.2)
CHLORIDE SERPL-SCNC: 106 MMOL/L (ref 98–107)
CO2 SERPL-SCNC: 26 MMOL/L (ref 22–29)
CREAT SERPL-MCNC: 1 MG/DL (ref 0.5–1)
GFR SERPL CREATININE-BSD FRML MDRD: >60 ML/MIN/1.73M2
GLUCOSE BLD-MCNC: 137 MG/DL (ref 74–99)
GLUCOSE BLD-MCNC: 174 MG/DL (ref 74–99)
GLUCOSE BLD-MCNC: 188 MG/DL (ref 74–99)
GLUCOSE BLD-MCNC: 198 MG/DL (ref 74–99)
GLUCOSE SERPL-MCNC: 142 MG/DL (ref 74–99)
POTASSIUM SERPL-SCNC: 3.9 MMOL/L (ref 3.5–5)
SODIUM SERPL-SCNC: 143 MMOL/L (ref 132–146)

## 2023-10-06 PROCEDURE — 80048 BASIC METABOLIC PNL TOTAL CA: CPT

## 2023-10-06 PROCEDURE — 2580000003 HC RX 258: Performed by: STUDENT IN AN ORGANIZED HEALTH CARE EDUCATION/TRAINING PROGRAM

## 2023-10-06 PROCEDURE — 97530 THERAPEUTIC ACTIVITIES: CPT

## 2023-10-06 PROCEDURE — 36415 COLL VENOUS BLD VENIPUNCTURE: CPT

## 2023-10-06 PROCEDURE — 6370000000 HC RX 637 (ALT 250 FOR IP): Performed by: STUDENT IN AN ORGANIZED HEALTH CARE EDUCATION/TRAINING PROGRAM

## 2023-10-06 PROCEDURE — 6370000000 HC RX 637 (ALT 250 FOR IP): Performed by: NURSE PRACTITIONER

## 2023-10-06 PROCEDURE — 2580000003 HC RX 258: Performed by: FAMILY MEDICINE

## 2023-10-06 PROCEDURE — 2060000000 HC ICU INTERMEDIATE R&B

## 2023-10-06 PROCEDURE — 6370000000 HC RX 637 (ALT 250 FOR IP): Performed by: FAMILY MEDICINE

## 2023-10-06 PROCEDURE — 82962 GLUCOSE BLOOD TEST: CPT

## 2023-10-06 PROCEDURE — 97535 SELF CARE MNGMENT TRAINING: CPT

## 2023-10-06 PROCEDURE — 6360000002 HC RX W HCPCS: Performed by: STUDENT IN AN ORGANIZED HEALTH CARE EDUCATION/TRAINING PROGRAM

## 2023-10-06 RX ORDER — LOSARTAN POTASSIUM 50 MG/1
50 TABLET ORAL DAILY
Status: DISCONTINUED | OUTPATIENT
Start: 2023-10-06 | End: 2023-10-06

## 2023-10-06 RX ORDER — LOSARTAN POTASSIUM 50 MG/1
25 TABLET ORAL DAILY
Status: DISCONTINUED | OUTPATIENT
Start: 2023-10-07 | End: 2023-10-07

## 2023-10-06 RX ADMIN — SODIUM CHLORIDE, PRESERVATIVE FREE 10 ML: 5 INJECTION INTRAVENOUS at 21:08

## 2023-10-06 RX ADMIN — LABETALOL HYDROCHLORIDE 10 MG: 5 INJECTION INTRAVENOUS at 04:52

## 2023-10-06 RX ADMIN — SODIUM CHLORIDE, PRESERVATIVE FREE 10 ML: 5 INJECTION INTRAVENOUS at 09:50

## 2023-10-06 RX ADMIN — INSULIN LISPRO 5 UNITS: 100 INJECTION, SOLUTION INTRAVENOUS; SUBCUTANEOUS at 18:40

## 2023-10-06 RX ADMIN — CYANOCOBALAMIN TAB 1000 MCG 1000 MCG: 1000 TAB at 09:50

## 2023-10-06 RX ADMIN — INSULIN LISPRO 5 UNITS: 100 INJECTION, SOLUTION INTRAVENOUS; SUBCUTANEOUS at 12:29

## 2023-10-06 RX ADMIN — INSULIN LISPRO 5 UNITS: 100 INJECTION, SOLUTION INTRAVENOUS; SUBCUTANEOUS at 09:50

## 2023-10-06 RX ADMIN — AMLODIPINE BESYLATE 5 MG: 5 TABLET ORAL at 09:51

## 2023-10-06 RX ADMIN — WATER 1000 MG: 1 INJECTION INTRAMUSCULAR; INTRAVENOUS; SUBCUTANEOUS at 09:50

## 2023-10-06 RX ADMIN — POTASSIUM BICARBONATE 20 MEQ: 782 TABLET, EFFERVESCENT ORAL at 18:40

## 2023-10-06 RX ADMIN — ROSUVASTATIN 40 MG: 20 TABLET, FILM COATED ORAL at 21:08

## 2023-10-06 RX ADMIN — ANORECTAL OINTMENT: 15.7; .44; 24; 20.6 OINTMENT TOPICAL at 21:08

## 2023-10-06 RX ADMIN — INSULIN GLARGINE 20 UNITS: 100 INJECTION, SOLUTION SUBCUTANEOUS at 21:08

## 2023-10-06 RX ADMIN — SODIUM CHLORIDE: 9 INJECTION, SOLUTION INTRAVENOUS at 01:33

## 2023-10-06 RX ADMIN — ANORECTAL OINTMENT: 15.7; .44; 24; 20.6 OINTMENT TOPICAL at 09:52

## 2023-10-06 NOTE — CARE COORDINATION
Care Coordination:    Following for discharge planning. PT OT evaluations complete with scores of 13  and 14. ST also on board for cognitive therapy. Spoke to daughter Yanet Veronica and received additional MALATHI choices. [facility]. 2. [place] 3. [facility]. Referral to [facility] who will review for acceptance. Will follow. 1218:  Patient accepted at [facility] and they will start pre cert today. Envelope, PASR, TORO ,  ambulette form in soft chart. Daughter  and patient informed of acceptance. Will follow.

## 2023-10-06 NOTE — NURSE NAVIGATOR
I met with Edith to review the Loop monitor d/c instructions. I provided a copy of her instructions with my card if she has any questions. She verbalized understanding.

## 2023-10-07 LAB
ANION GAP SERPL CALCULATED.3IONS-SCNC: 13 MMOL/L (ref 7–16)
BUN SERPL-MCNC: 8 MG/DL (ref 6–23)
CALCIUM SERPL-MCNC: 9.2 MG/DL (ref 8.6–10.2)
CHLORIDE SERPL-SCNC: 103 MMOL/L (ref 98–107)
CO2 SERPL-SCNC: 24 MMOL/L (ref 22–29)
CREAT SERPL-MCNC: 0.9 MG/DL (ref 0.5–1)
GFR SERPL CREATININE-BSD FRML MDRD: >60 ML/MIN/1.73M2
GLUCOSE BLD-MCNC: 127 MG/DL (ref 74–99)
GLUCOSE BLD-MCNC: 136 MG/DL (ref 74–99)
GLUCOSE BLD-MCNC: 162 MG/DL (ref 74–99)
GLUCOSE BLD-MCNC: 194 MG/DL (ref 74–99)
GLUCOSE SERPL-MCNC: 156 MG/DL (ref 74–99)
POTASSIUM SERPL-SCNC: 3.6 MMOL/L (ref 3.5–5)
SODIUM SERPL-SCNC: 140 MMOL/L (ref 132–146)

## 2023-10-07 PROCEDURE — 2580000003 HC RX 258: Performed by: STUDENT IN AN ORGANIZED HEALTH CARE EDUCATION/TRAINING PROGRAM

## 2023-10-07 PROCEDURE — 82962 GLUCOSE BLOOD TEST: CPT

## 2023-10-07 PROCEDURE — 36415 COLL VENOUS BLD VENIPUNCTURE: CPT

## 2023-10-07 PROCEDURE — 2580000003 HC RX 258: Performed by: FAMILY MEDICINE

## 2023-10-07 PROCEDURE — 1200000000 HC SEMI PRIVATE

## 2023-10-07 PROCEDURE — 6370000000 HC RX 637 (ALT 250 FOR IP): Performed by: FAMILY MEDICINE

## 2023-10-07 PROCEDURE — 6370000000 HC RX 637 (ALT 250 FOR IP): Performed by: NURSE PRACTITIONER

## 2023-10-07 PROCEDURE — 80048 BASIC METABOLIC PNL TOTAL CA: CPT

## 2023-10-07 PROCEDURE — 6370000000 HC RX 637 (ALT 250 FOR IP): Performed by: STUDENT IN AN ORGANIZED HEALTH CARE EDUCATION/TRAINING PROGRAM

## 2023-10-07 PROCEDURE — 6360000002 HC RX W HCPCS: Performed by: STUDENT IN AN ORGANIZED HEALTH CARE EDUCATION/TRAINING PROGRAM

## 2023-10-07 RX ORDER — LOSARTAN POTASSIUM 50 MG/1
50 TABLET ORAL DAILY
Status: DISCONTINUED | OUTPATIENT
Start: 2023-10-08 | End: 2023-10-08

## 2023-10-07 RX ADMIN — AMLODIPINE BESYLATE 5 MG: 5 TABLET ORAL at 09:34

## 2023-10-07 RX ADMIN — WATER 1000 MG: 1 INJECTION INTRAMUSCULAR; INTRAVENOUS; SUBCUTANEOUS at 09:29

## 2023-10-07 RX ADMIN — SODIUM CHLORIDE, PRESERVATIVE FREE 10 ML: 5 INJECTION INTRAVENOUS at 09:28

## 2023-10-07 RX ADMIN — ROSUVASTATIN 40 MG: 20 TABLET, FILM COATED ORAL at 19:55

## 2023-10-07 RX ADMIN — LOSARTAN POTASSIUM 25 MG: 50 TABLET, FILM COATED ORAL at 09:40

## 2023-10-07 RX ADMIN — LABETALOL HYDROCHLORIDE 10 MG: 5 INJECTION INTRAVENOUS at 05:46

## 2023-10-07 RX ADMIN — LABETALOL HYDROCHLORIDE 10 MG: 5 INJECTION INTRAVENOUS at 02:22

## 2023-10-07 RX ADMIN — INSULIN GLARGINE 20 UNITS: 100 INJECTION, SOLUTION SUBCUTANEOUS at 19:55

## 2023-10-07 RX ADMIN — INSULIN LISPRO 5 UNITS: 100 INJECTION, SOLUTION INTRAVENOUS; SUBCUTANEOUS at 18:18

## 2023-10-07 RX ADMIN — CYANOCOBALAMIN TAB 1000 MCG 1000 MCG: 1000 TAB at 09:34

## 2023-10-07 RX ADMIN — ANORECTAL OINTMENT: 15.7; .44; 24; 20.6 OINTMENT TOPICAL at 19:55

## 2023-10-07 RX ADMIN — ANORECTAL OINTMENT: 15.7; .44; 24; 20.6 OINTMENT TOPICAL at 09:34

## 2023-10-07 RX ADMIN — INSULIN LISPRO 5 UNITS: 100 INJECTION, SOLUTION INTRAVENOUS; SUBCUTANEOUS at 12:58

## 2023-10-07 RX ADMIN — INSULIN LISPRO 5 UNITS: 100 INJECTION, SOLUTION INTRAVENOUS; SUBCUTANEOUS at 09:29

## 2023-10-07 RX ADMIN — SODIUM CHLORIDE, PRESERVATIVE FREE 10 ML: 5 INJECTION INTRAVENOUS at 19:56

## 2023-10-07 ASSESSMENT — PAIN SCALES - GENERAL
PAINLEVEL_OUTOF10: 0
PAINLEVEL_OUTOF10: 0

## 2023-10-07 NOTE — PLAN OF CARE
Problem: Discharge Planning  Goal: Discharge to home or other facility with appropriate resources  Outcome: Progressing     Problem: Skin/Tissue Integrity  Goal: Absence of new skin breakdown  Description: 1. Monitor for areas of redness and/or skin breakdown  2. Assess vascular access sites hourly  3. Every 4-6 hours minimum:  Change oxygen saturation probe site  4. Every 4-6 hours:  If on nasal continuous positive airway pressure, respiratory therapy assess nares and determine need for appliance change or resting period.   Outcome: Progressing     Problem: Safety - Adult  Goal: Free from fall injury  Outcome: Progressing     Problem: Pain  Goal: Verbalizes/displays adequate comfort level or baseline comfort level  Outcome: Progressing     Problem: ABCDS Injury Assessment  Goal: Absence of physical injury  Outcome: Progressing     Problem: Chronic Conditions and Co-morbidities  Goal: Patient's chronic conditions and co-morbidity symptoms are monitored and maintained or improved  Outcome: Progressing     Problem: Nutrition Deficit:  Goal: Optimize nutritional status  Outcome: Progressing

## 2023-10-08 LAB
ANION GAP SERPL CALCULATED.3IONS-SCNC: 16 MMOL/L (ref 7–16)
BUN SERPL-MCNC: 9 MG/DL (ref 6–23)
CALCIUM SERPL-MCNC: 9.5 MG/DL (ref 8.6–10.2)
CHLORIDE SERPL-SCNC: 103 MMOL/L (ref 98–107)
CO2 SERPL-SCNC: 23 MMOL/L (ref 22–29)
CREAT SERPL-MCNC: 1.2 MG/DL (ref 0.5–1)
ERYTHROCYTE [DISTWIDTH] IN BLOOD BY AUTOMATED COUNT: 12.1 % (ref 11.5–15)
GFR SERPL CREATININE-BSD FRML MDRD: 54 ML/MIN/1.73M2
GLUCOSE BLD-MCNC: 107 MG/DL (ref 74–99)
GLUCOSE BLD-MCNC: 149 MG/DL (ref 74–99)
GLUCOSE BLD-MCNC: 151 MG/DL (ref 74–99)
GLUCOSE BLD-MCNC: 180 MG/DL (ref 74–99)
GLUCOSE SERPL-MCNC: 173 MG/DL (ref 74–99)
HCT VFR BLD AUTO: 31.8 % (ref 34–48)
HGB BLD-MCNC: 10.9 G/DL (ref 11.5–15.5)
MCH RBC QN AUTO: 38.4 PG (ref 26–35)
MCHC RBC AUTO-ENTMCNC: 34.3 G/DL (ref 32–34.5)
MCV RBC AUTO: 112 FL (ref 80–99.9)
PLATELET # BLD AUTO: 176 K/UL (ref 130–450)
PMV BLD AUTO: 10.6 FL (ref 7–12)
POTASSIUM SERPL-SCNC: 3.6 MMOL/L (ref 3.5–5)
RBC # BLD AUTO: 2.84 M/UL (ref 3.5–5.5)
SODIUM SERPL-SCNC: 142 MMOL/L (ref 132–146)
WBC OTHER # BLD: 7.5 K/UL (ref 4.5–11.5)

## 2023-10-08 PROCEDURE — 97535 SELF CARE MNGMENT TRAINING: CPT

## 2023-10-08 PROCEDURE — 6370000000 HC RX 637 (ALT 250 FOR IP): Performed by: FAMILY MEDICINE

## 2023-10-08 PROCEDURE — 36415 COLL VENOUS BLD VENIPUNCTURE: CPT

## 2023-10-08 PROCEDURE — 6360000002 HC RX W HCPCS: Performed by: STUDENT IN AN ORGANIZED HEALTH CARE EDUCATION/TRAINING PROGRAM

## 2023-10-08 PROCEDURE — 80048 BASIC METABOLIC PNL TOTAL CA: CPT

## 2023-10-08 PROCEDURE — 85027 COMPLETE CBC AUTOMATED: CPT

## 2023-10-08 PROCEDURE — 1200000000 HC SEMI PRIVATE

## 2023-10-08 PROCEDURE — 6370000000 HC RX 637 (ALT 250 FOR IP): Performed by: STUDENT IN AN ORGANIZED HEALTH CARE EDUCATION/TRAINING PROGRAM

## 2023-10-08 PROCEDURE — 6370000000 HC RX 637 (ALT 250 FOR IP): Performed by: NURSE PRACTITIONER

## 2023-10-08 PROCEDURE — 97530 THERAPEUTIC ACTIVITIES: CPT

## 2023-10-08 PROCEDURE — 82962 GLUCOSE BLOOD TEST: CPT

## 2023-10-08 PROCEDURE — 2580000003 HC RX 258: Performed by: FAMILY MEDICINE

## 2023-10-08 RX ORDER — LOSARTAN POTASSIUM 25 MG/1
25 TABLET ORAL DAILY
Status: DISCONTINUED | OUTPATIENT
Start: 2023-10-09 | End: 2023-10-09 | Stop reason: HOSPADM

## 2023-10-08 RX ORDER — HYDRALAZINE HYDROCHLORIDE 25 MG/1
25 TABLET, FILM COATED ORAL EVERY 8 HOURS SCHEDULED
Status: DISCONTINUED | OUTPATIENT
Start: 2023-10-08 | End: 2023-10-09 | Stop reason: HOSPADM

## 2023-10-08 RX ADMIN — ROSUVASTATIN 40 MG: 20 TABLET, FILM COATED ORAL at 21:09

## 2023-10-08 RX ADMIN — LOSARTAN POTASSIUM 50 MG: 50 TABLET, FILM COATED ORAL at 08:13

## 2023-10-08 RX ADMIN — INSULIN LISPRO 5 UNITS: 100 INJECTION, SOLUTION INTRAVENOUS; SUBCUTANEOUS at 12:44

## 2023-10-08 RX ADMIN — SODIUM CHLORIDE, PRESERVATIVE FREE 10 ML: 5 INJECTION INTRAVENOUS at 08:13

## 2023-10-08 RX ADMIN — INSULIN GLARGINE 20 UNITS: 100 INJECTION, SOLUTION SUBCUTANEOUS at 21:09

## 2023-10-08 RX ADMIN — LABETALOL HYDROCHLORIDE 10 MG: 5 INJECTION INTRAVENOUS at 02:02

## 2023-10-08 RX ADMIN — CYANOCOBALAMIN TAB 1000 MCG 1000 MCG: 1000 TAB at 08:13

## 2023-10-08 RX ADMIN — LABETALOL HYDROCHLORIDE 10 MG: 5 INJECTION INTRAVENOUS at 08:58

## 2023-10-08 RX ADMIN — HYDRALAZINE HYDROCHLORIDE 25 MG: 25 TABLET, FILM COATED ORAL at 21:09

## 2023-10-08 RX ADMIN — INSULIN LISPRO 5 UNITS: 100 INJECTION, SOLUTION INTRAVENOUS; SUBCUTANEOUS at 18:17

## 2023-10-08 RX ADMIN — ANORECTAL OINTMENT: 15.7; .44; 24; 20.6 OINTMENT TOPICAL at 08:28

## 2023-10-08 RX ADMIN — ANORECTAL OINTMENT: 15.7; .44; 24; 20.6 OINTMENT TOPICAL at 21:10

## 2023-10-08 RX ADMIN — LABETALOL HYDROCHLORIDE 10 MG: 5 INJECTION INTRAVENOUS at 14:26

## 2023-10-08 RX ADMIN — HYDRALAZINE HYDROCHLORIDE 25 MG: 25 TABLET, FILM COATED ORAL at 14:26

## 2023-10-08 RX ADMIN — SODIUM CHLORIDE, PRESERVATIVE FREE 10 ML: 5 INJECTION INTRAVENOUS at 21:09

## 2023-10-08 RX ADMIN — AMLODIPINE BESYLATE 5 MG: 5 TABLET ORAL at 08:13

## 2023-10-08 RX ADMIN — LABETALOL HYDROCHLORIDE 10 MG: 5 INJECTION INTRAVENOUS at 21:10

## 2023-10-08 RX ADMIN — INSULIN LISPRO 5 UNITS: 100 INJECTION, SOLUTION INTRAVENOUS; SUBCUTANEOUS at 08:12

## 2023-10-08 NOTE — PLAN OF CARE
Problem: Discharge Planning  Goal: Discharge to home or other facility with appropriate resources  10/7/2023 2050 by Rick Howe RN  Outcome: Progressing  10/7/2023 1741 by Orquidea Sellers RN  Outcome: Progressing     Problem: Skin/Tissue Integrity  Goal: Absence of new skin breakdown  Description: 1. Monitor for areas of redness and/or skin breakdown  2. Assess vascular access sites hourly  3. Every 4-6 hours minimum:  Change oxygen saturation probe site  4. Every 4-6 hours:  If on nasal continuous positive airway pressure, respiratory therapy assess nares and determine need for appliance change or resting period.   10/7/2023 2050 by Rick Howe RN  Outcome: Progressing  10/7/2023 1741 by Orquidea Sellers RN  Outcome: Progressing     Problem: Safety - Adult  Goal: Free from fall injury  10/7/2023 2050 by Rick Howe RN  Outcome: Progressing  10/7/2023 1741 by Orquidea Sellers RN  Outcome: Progressing     Problem: Pain  Goal: Verbalizes/displays adequate comfort level or baseline comfort level  10/7/2023 2050 by Rick Howe RN  Outcome: Progressing  10/7/2023 1741 by Orquidea Sellers RN  Outcome: Progressing     Problem: ABCDS Injury Assessment  Goal: Absence of physical injury  10/7/2023 2050 by Rick Howe RN  Outcome: Progressing  10/7/2023 1741 by Orquidea Sellers RN  Outcome: Progressing     Problem: Chronic Conditions and Co-morbidities  Goal: Patient's chronic conditions and co-morbidity symptoms are monitored and maintained or improved  10/7/2023 2050 by Rick Howe RN  Outcome: Progressing  10/7/2023 1741 by Orquidea Sellers RN  Outcome: Progressing     Problem: Nutrition Deficit:  Goal: Optimize nutritional status  10/7/2023 2050 by Rick Howe RN  Outcome: Progressing  10/7/2023 1741 by Orquidea Sellers RN  Outcome: Progressing

## 2023-10-09 VITALS
OXYGEN SATURATION: 96 % | HEART RATE: 83 BPM | WEIGHT: 191.25 LBS | TEMPERATURE: 97.7 F | HEIGHT: 64 IN | RESPIRATION RATE: 18 BRPM | SYSTOLIC BLOOD PRESSURE: 148 MMHG | BODY MASS INDEX: 32.65 KG/M2 | DIASTOLIC BLOOD PRESSURE: 98 MMHG

## 2023-10-09 LAB
ANION GAP SERPL CALCULATED.3IONS-SCNC: 16 MMOL/L (ref 7–16)
BUN SERPL-MCNC: 17 MG/DL (ref 6–23)
CALCIUM SERPL-MCNC: 9.3 MG/DL (ref 8.6–10.2)
CHLORIDE SERPL-SCNC: 99 MMOL/L (ref 98–107)
CO2 SERPL-SCNC: 21 MMOL/L (ref 22–29)
CREAT SERPL-MCNC: 1.2 MG/DL (ref 0.5–1)
GFR SERPL CREATININE-BSD FRML MDRD: 53 ML/MIN/1.73M2
GLUCOSE BLD-MCNC: 171 MG/DL (ref 74–99)
GLUCOSE BLD-MCNC: 200 MG/DL (ref 74–99)
GLUCOSE SERPL-MCNC: 235 MG/DL (ref 74–99)
POTASSIUM SERPL-SCNC: 4 MMOL/L (ref 3.5–5)
SODIUM SERPL-SCNC: 136 MMOL/L (ref 132–146)

## 2023-10-09 PROCEDURE — 6370000000 HC RX 637 (ALT 250 FOR IP): Performed by: STUDENT IN AN ORGANIZED HEALTH CARE EDUCATION/TRAINING PROGRAM

## 2023-10-09 PROCEDURE — 80048 BASIC METABOLIC PNL TOTAL CA: CPT

## 2023-10-09 PROCEDURE — 6370000000 HC RX 637 (ALT 250 FOR IP): Performed by: NURSE PRACTITIONER

## 2023-10-09 PROCEDURE — 82962 GLUCOSE BLOOD TEST: CPT

## 2023-10-09 PROCEDURE — 36415 COLL VENOUS BLD VENIPUNCTURE: CPT

## 2023-10-09 PROCEDURE — 97530 THERAPEUTIC ACTIVITIES: CPT

## 2023-10-09 PROCEDURE — 2580000003 HC RX 258: Performed by: FAMILY MEDICINE

## 2023-10-09 PROCEDURE — 6370000000 HC RX 637 (ALT 250 FOR IP): Performed by: FAMILY MEDICINE

## 2023-10-09 RX ORDER — HYDRALAZINE HYDROCHLORIDE 25 MG/1
25 TABLET, FILM COATED ORAL EVERY 8 HOURS SCHEDULED
Qty: 90 TABLET | Refills: 3 | Status: SHIPPED | OUTPATIENT
Start: 2023-10-09

## 2023-10-09 RX ORDER — ROSUVASTATIN CALCIUM 40 MG/1
40 TABLET, COATED ORAL NIGHTLY
Qty: 30 TABLET | Refills: 3 | Status: SHIPPED | OUTPATIENT
Start: 2023-10-09

## 2023-10-09 RX ORDER — ASPIRIN 81 MG/1
81 TABLET ORAL DAILY
Qty: 90 TABLET | Refills: 1 | Status: SHIPPED | OUTPATIENT
Start: 2023-10-09

## 2023-10-09 RX ORDER — LOSARTAN POTASSIUM 25 MG/1
25 TABLET ORAL DAILY
Qty: 30 TABLET | Refills: 3 | Status: SHIPPED | OUTPATIENT
Start: 2023-10-10

## 2023-10-09 RX ORDER — LABETALOL HYDROCHLORIDE 5 MG/ML
10 INJECTION, SOLUTION INTRAVENOUS PRN
Status: DISCONTINUED | OUTPATIENT
Start: 2023-10-09 | End: 2023-10-09 | Stop reason: HOSPADM

## 2023-10-09 RX ORDER — AMLODIPINE BESYLATE 5 MG/1
5 TABLET ORAL DAILY
Qty: 30 TABLET | Refills: 3 | Status: SHIPPED | OUTPATIENT
Start: 2023-10-10

## 2023-10-09 RX ADMIN — LOSARTAN POTASSIUM 25 MG: 25 TABLET, FILM COATED ORAL at 09:12

## 2023-10-09 RX ADMIN — AMLODIPINE BESYLATE 5 MG: 5 TABLET ORAL at 09:12

## 2023-10-09 RX ADMIN — INSULIN LISPRO 5 UNITS: 100 INJECTION, SOLUTION INTRAVENOUS; SUBCUTANEOUS at 09:13

## 2023-10-09 RX ADMIN — HYDRALAZINE HYDROCHLORIDE 25 MG: 25 TABLET, FILM COATED ORAL at 05:11

## 2023-10-09 RX ADMIN — ANORECTAL OINTMENT: 15.7; .44; 24; 20.6 OINTMENT TOPICAL at 09:12

## 2023-10-09 RX ADMIN — INSULIN LISPRO 5 UNITS: 100 INJECTION, SOLUTION INTRAVENOUS; SUBCUTANEOUS at 12:01

## 2023-10-09 RX ADMIN — INSULIN LISPRO 2 UNITS: 100 INJECTION, SOLUTION INTRAVENOUS; SUBCUTANEOUS at 09:11

## 2023-10-09 RX ADMIN — HYDRALAZINE HYDROCHLORIDE 25 MG: 25 TABLET, FILM COATED ORAL at 14:56

## 2023-10-09 RX ADMIN — CYANOCOBALAMIN TAB 1000 MCG 1000 MCG: 1000 TAB at 09:12

## 2023-10-09 RX ADMIN — SODIUM CHLORIDE, PRESERVATIVE FREE 10 ML: 5 INJECTION INTRAVENOUS at 09:12

## 2023-10-09 ASSESSMENT — PAIN SCALES - GENERAL
PAINLEVEL_OUTOF10: 0

## 2023-10-09 NOTE — CARE COORDINATION
Discharge order noted. Transportation set up via [employer] for 4 pm today to [facility]. Charge nurse, RN, liaison, patient and daughter Arsalan Quintanilla all notified. Envelope, PASR, TORO , ambulette form in soft chart.     Merlinda Elder RN CM  [phone]

## 2023-10-09 NOTE — CARE COORDINATION
Per [facility] Slot from [facility], precert is still pending which was started on Friday. Envelope, PASR, TORO ,  ambulette form in soft chart. Vimal Pike RN NE  [phone]        Per [facility] Slot from [facility], precert has been obtained. Charge nurse made aware and is checking if pateint can discharge today. Envelope, PASR, TORO ,  ambulette form in soft chart.     Vimal Pike RN CM  [phone]

## 2023-10-09 NOTE — PLAN OF CARE
Problem: Discharge Planning  Goal: Discharge to home or other facility with appropriate resources  10/9/2023 1504 by Amadou Verma RN  Outcome: Adequate for Discharge  10/9/2023 1504 by Amadou Verma RN  Outcome: Progressing     Problem: Skin/Tissue Integrity  Goal: Absence of new skin breakdown  Description: 1. Monitor for areas of redness and/or skin breakdown  2. Assess vascular access sites hourly  3. Every 4-6 hours minimum:  Change oxygen saturation probe site  4. Every 4-6 hours:  If on nasal continuous positive airway pressure, respiratory therapy assess nares and determine need for appliance change or resting period.   10/9/2023 1504 by Amaodu Verma RN  Outcome: Adequate for Discharge  10/9/2023 1504 by Amadou Verma RN  Outcome: Progressing     Problem: Safety - Adult  Goal: Free from fall injury  10/9/2023 1504 by Amadou Verma RN  Outcome: Adequate for Discharge  10/9/2023 1504 by Amadou Verma RN  Outcome: Progressing     Problem: Pain  Goal: Verbalizes/displays adequate comfort level or baseline comfort level  10/9/2023 1504 by Amadou Verma RN  Outcome: Adequate for Discharge  10/9/2023 1504 by Amadou Verma RN  Outcome: Progressing     Problem: ABCDS Injury Assessment  Goal: Absence of physical injury  10/9/2023 1504 by Amadou Verma RN  Outcome: Adequate for Discharge  10/9/2023 1504 by Amadou Verma RN  Outcome: Progressing     Problem: Chronic Conditions and Co-morbidities  Goal: Patient's chronic conditions and co-morbidity symptoms are monitored and maintained or improved  10/9/2023 1504 by Amadou Verma RN  Outcome: Adequate for Discharge  10/9/2023 1504 by Amadou Verma RN  Outcome: Progressing     Problem: Nutrition Deficit:  Goal: Optimize nutritional status  10/9/2023 1504 by Amadou Verma RN  Outcome: Adequate for Discharge  10/9/2023 1504 by Amadou Verma RN  Outcome: Progressing

## 2023-10-09 NOTE — DISCHARGE SUMMARY
Hospital Medicine Discharge Summary    Patient ID: Jackquelyn Phalen      Patient's PCP: No primary care provider on file. Admit Date: 10/2/2023     Discharge Date: 10/9/2023    Admitting Physician: Tamar Clement MD     Discharge Physician: Tamar Clement MD     Discharge Diagnoses: Active Hospital Problems    Diagnosis Date Noted    Acute stroke due to ischemia ([address]) [I63.9] 10/04/2023    Uncontrolled hypertension [I10] 10/04/2023    Uncontrolled type 2 diabetes mellitus with hyperglycemia ([address]) [E11.65] 10/04/2023    Mixed hyperlipidemia [E78.2] 10/04/2023    Cognitive communication deficit [R41.841] 10/04/2023    Ataxia [R27.0] 10/02/2023       The patient was seen and examined on day of discharge and this discharge summary is in conjunction with any daily progress note from day of discharge. Hospital Course:     Ms. Jackquelyn Phalen, a 65y.o. year old female  who  has no past medical history on file. Presented with c/o lower extremity weakness and gait instability . Symptoms are presented for over couple of months and progressively worsening. CT head   There is no acute intracranial hemorrhage or acute intracranial abnormality  2. Old lacunar infarct within the lateral aspect of the left basal ganglia  3. Old lacunar infarct within the left thalamus. 10/3: Echo Moderate concentric LVH with asymmetric septal hypertrophy. Visually estimated LVEF is 60 %,      10/4:  MRI Brain: Acute to subacute infarcts involving the right cerebral peduncle as well as the paramedian frontal lobes bilaterally.  Chronic lacunar infarcts involving both thalami  Vitamin b12 was low, she was given 1000 mcg IM X 1 dose followed by oral Vitamin B12 supplement     10/5:  Reveal LINQ II Insertable Cardiac Monitor Implantation was done and she is scheduled to get Cardiac MRI on 10/26/2023 as outpatient for further evaluation of Moderate concentric LVH with asymmetric septal hypertrophy     For new onset diabetes HbA1c was 7.6, She

## 2023-11-15 NOTE — PROGRESS NOTES
[facility]  Internal Medicine Residency Program  [facility] Note      SUBJECTIVE:  CC: had concerns including New Patient (Pt moved from [place] in 2017- has not had PCP since). HPI:  Austin Willett is a 59 y. o.female presenting to [facility] for new patient appointment. She is here with her son today. Patient was recently hospitalized in the beginning of October for ambulatory dysfunction. She was discharged to [facility] for [facility] and was recently discharged. She is here to establish care with a PCP, as she has not had a PCP since moving to the area in 2017. She was found to have severe HTN during the hosptial stay and was started on amlodipine, hydralazine, and losartan. She denied chest pain at that time. Echo showed normal EF and systolic function however it also showed LVH with asymmetric septal hypertrophy. Due to this finding, a Reveal LINQ II Insertable Cardiac Monitor Implantation was done. She was scheduled for a cardiac MRI on 10/26/23 but did not show (was still in [facility] at that time). MRI showed acute vs subacute right cerebtral peduncle and paramedian frontal lobe infarcts BL as well as old lacunar infarcts. Location of infarcts did not correspond with any neurological deficits on examination. Likely, these are due to a longstanding history of uncontrolled HTN. ADLs: Ambulates with wheely walker, but was using wheelchair at [facility]. Today is the first day with the walker. She underwent daily PT at the [facility]. Her daughter does the cooking and cleaning. They have groceries delivered to the apartment. She is able to toilet on her own. Showers on her own using shower chair and detachable shower head. Son serves as transportation and states he is able to take her to any necessary appointments.     Past medical history:   Acute stroke due to ischemia [facility])    Essential (primary) hypertension    Uncontrolled type 2 diabetes mellitus with hyperglycemia ([address])    Mixed

## 2023-11-16 ENCOUNTER — OFFICE VISIT (OUTPATIENT)
Dept: INTERNAL MEDICINE | Age: 63
End: 2023-11-16
Payer: MEDICAID

## 2023-11-16 VITALS
HEART RATE: 106 BPM | DIASTOLIC BLOOD PRESSURE: 88 MMHG | OXYGEN SATURATION: 96 % | WEIGHT: 190 LBS | SYSTOLIC BLOOD PRESSURE: 135 MMHG | RESPIRATION RATE: 18 BRPM | TEMPERATURE: 97.4 F | BODY MASS INDEX: 33.66 KG/M2 | HEIGHT: 63 IN

## 2023-11-16 DIAGNOSIS — E78.2 MIXED HYPERLIPIDEMIA: ICD-10-CM

## 2023-11-16 DIAGNOSIS — Z12.11 SCREENING FOR COLON CANCER: ICD-10-CM

## 2023-11-16 DIAGNOSIS — Z12.4 SCREENING FOR CERVICAL CANCER: ICD-10-CM

## 2023-11-16 DIAGNOSIS — Z11.59 ENCOUNTER FOR HEPATITIS C SCREENING TEST FOR LOW RISK PATIENT: ICD-10-CM

## 2023-11-16 DIAGNOSIS — E66.9 OBESITY (BMI 30-39.9): ICD-10-CM

## 2023-11-16 DIAGNOSIS — E53.8 B12 DEFICIENCY: ICD-10-CM

## 2023-11-16 DIAGNOSIS — N17.9 AKI (ACUTE KIDNEY INJURY) (HCC): ICD-10-CM

## 2023-11-16 DIAGNOSIS — I10 ESSENTIAL (PRIMARY) HYPERTENSION: Primary | ICD-10-CM

## 2023-11-16 DIAGNOSIS — R26.2 DIFFICULTY IN WALKING, NOT ELSEWHERE CLASSIFIED: ICD-10-CM

## 2023-11-16 DIAGNOSIS — E11.65 UNCONTROLLED TYPE 2 DIABETES MELLITUS WITH HYPERGLYCEMIA (HCC): ICD-10-CM

## 2023-11-16 DIAGNOSIS — I69.30 CEREBRAL MULTI-INFARCT STATE: ICD-10-CM

## 2023-11-16 DIAGNOSIS — E56.9 VITAMIN DEFICIENCY: ICD-10-CM

## 2023-11-16 DIAGNOSIS — Z11.4 SCREENING FOR HIV (HUMAN IMMUNODEFICIENCY VIRUS): ICD-10-CM

## 2023-11-16 PROBLEM — M62.81 MUSCLE WEAKNESS (GENERALIZED): Status: ACTIVE | Noted: 2023-10-09

## 2023-11-16 PROBLEM — R27.8 OTHER LACK OF COORDINATION: Status: ACTIVE | Noted: 2023-10-02

## 2023-11-16 PROBLEM — I67.82 CEREBRAL ISCHEMIA: Status: ACTIVE | Noted: 2023-10-09

## 2023-11-16 PROBLEM — I42.2 OTHER HYPERTROPHIC CARDIOMYOPATHY (HCC): Status: ACTIVE | Noted: 2023-10-09

## 2023-11-16 PROBLEM — R48.8 OTHER SYMBOLIC DYSFUNCTIONS: Status: ACTIVE | Noted: 2023-10-10

## 2023-11-16 PROBLEM — Z95.818 PRESENCE OF OTHER CARDIAC IMPLANTS AND GRAFTS: Status: ACTIVE | Noted: 2023-10-09

## 2023-11-16 PROCEDURE — 99202 OFFICE O/P NEW SF 15 MIN: CPT | Performed by: STUDENT IN AN ORGANIZED HEALTH CARE EDUCATION/TRAINING PROGRAM

## 2023-11-16 SDOH — ECONOMIC STABILITY: INCOME INSECURITY: HOW HARD IS IT FOR YOU TO PAY FOR THE VERY BASICS LIKE FOOD, HOUSING, MEDICAL CARE, AND HEATING?: NOT HARD AT ALL

## 2023-11-16 SDOH — ECONOMIC STABILITY: FOOD INSECURITY: WITHIN THE PAST 12 MONTHS, THE FOOD YOU BOUGHT JUST DIDN'T LAST AND YOU DIDN'T HAVE MONEY TO GET MORE.: NEVER TRUE

## 2023-11-16 SDOH — ECONOMIC STABILITY: HOUSING INSECURITY
IN THE LAST 12 MONTHS, WAS THERE A TIME WHEN YOU DID NOT HAVE A STEADY PLACE TO SLEEP OR SLEPT IN A SHELTER (INCLUDING NOW)?: NO

## 2023-11-16 SDOH — ECONOMIC STABILITY: FOOD INSECURITY: WITHIN THE PAST 12 MONTHS, YOU WORRIED THAT YOUR FOOD WOULD RUN OUT BEFORE YOU GOT MONEY TO BUY MORE.: NEVER TRUE

## 2023-11-16 ASSESSMENT — PATIENT HEALTH QUESTIONNAIRE - PHQ9
SUM OF ALL RESPONSES TO PHQ9 QUESTIONS 1 & 2: 0
2. FEELING DOWN, DEPRESSED OR HOPELESS: 0
SUM OF ALL RESPONSES TO PHQ QUESTIONS 1-9: 0
1. LITTLE INTEREST OR PLEASURE IN DOING THINGS: 0
SUM OF ALL RESPONSES TO PHQ QUESTIONS 1-9: 0

## 2023-11-16 NOTE — PATIENT INSTRUCTIONS
Lab Tests to get prior to next Visit   Please make sure to not eat or drink anything for at least 8 hours before getting your blood work done    Referrals   OBGYN   General surgery for screening colonoscopy      Please follow up in 5 weeks with our clinic. Please call if your symptoms worsen or fail to improve.

## 2023-11-16 NOTE — PROGRESS NOTES
[facility]  Internal Medicine Residency Clinic    Attending Physician Statement  I have discussed the case, including pertinent history and exam findings with the resident physician. I have seen and examined the patient and the key elements of the encounter have been performed by me. I agree with the assessment, plan and orders as documented by the resident. I have reviewed all pertinent PMHx, PSHx, FamHx, SocialHx, medications, and allergies and updated history as appropriate. Patient here for new patient appointment to establish care. Patient has hx HTN, DM, not on insulin, hyperlipidemia, B12 deficiency and multiple cerebral infarcts. BP is controlled, DM recently diagnosed with HBA1c 7.6%. B12 level was <150 and noted to have increased .8. TSH was normal. Patient uses a walker when going outside or for longer distances, states she does not need it in her daughters apartment. Remainder of medical problems as per resident note.     Maria Isabel Hopkins DO  11/16/2023 11:18 AM

## 2023-11-17 ASSESSMENT — ENCOUNTER SYMPTOMS
SHORTNESS OF BREATH: 0
NAUSEA: 0
COUGH: 0
SINUS PAIN: 0
BLOOD IN STOOL: 0
BACK PAIN: 0
DIARRHEA: 0
ABDOMINAL PAIN: 0
VOMITING: 0
CONSTIPATION: 0

## 2023-11-30 RX ORDER — LANOLIN ALCOHOL/MO/W.PET/CERES
CREAM (GRAM) TOPICAL
Qty: 30 TABLET | Refills: 4 | Status: SHIPPED | OUTPATIENT
Start: 2023-11-30

## 2023-11-30 RX ORDER — ASPIRIN 81 MG/1
TABLET, COATED ORAL
Qty: 30 TABLET | Refills: 4 | Status: SHIPPED | OUTPATIENT
Start: 2023-11-30

## 2023-11-30 RX ORDER — ACETAMINOPHEN 325 MG/1
325 TABLET ORAL EVERY 6 HOURS PRN
Qty: 120 TABLET | Refills: 4 | Status: SHIPPED | OUTPATIENT
Start: 2023-11-30

## 2023-11-30 RX ORDER — AMLODIPINE BESYLATE 5 MG/1
TABLET ORAL
Qty: 30 TABLET | Refills: 4 | Status: SHIPPED | OUTPATIENT
Start: 2023-11-30

## 2023-11-30 RX ORDER — LOSARTAN POTASSIUM 25 MG/1
TABLET ORAL
Qty: 30 TABLET | Refills: 4 | Status: SHIPPED | OUTPATIENT
Start: 2023-11-30

## 2023-11-30 RX ORDER — HYDRALAZINE HYDROCHLORIDE 25 MG/1
25 TABLET, FILM COATED ORAL EVERY 8 HOURS SCHEDULED
Qty: 90 TABLET | Refills: 4 | Status: SHIPPED | OUTPATIENT
Start: 2023-11-30

## 2023-11-30 RX ORDER — ROSUVASTATIN CALCIUM 40 MG/1
40 TABLET, COATED ORAL NIGHTLY
Qty: 30 TABLET | Refills: 4 | Status: SHIPPED | OUTPATIENT
Start: 2023-11-30

## 2023-12-21 DIAGNOSIS — E87.29 HIGH ANION GAP METABOLIC ACIDOSIS: ICD-10-CM

## 2023-12-21 LAB
ANION GAP SERPL CALCULATED.3IONS-SCNC: 15 MMOL/L (ref 7–16)
BUN BLDV-MCNC: 21 MG/DL (ref 6–23)
CALCIUM SERPL-MCNC: 10 MG/DL (ref 8.6–10.2)
CHLORIDE BLD-SCNC: 106 MMOL/L (ref 98–107)
CO2: 21 MMOL/L (ref 22–29)
CREAT SERPL-MCNC: 1 MG/DL (ref 0.5–1)
GFR SERPL CREATININE-BSD FRML MDRD: >60 ML/MIN/1.73M2
GLUCOSE BLD-MCNC: 131 MG/DL (ref 74–99)
LACTIC ACID: 2.3 MMOL/L (ref 0.5–2.2)
MAGNESIUM: 1.9 MG/DL (ref 1.6–2.6)
POTASSIUM SERPL-SCNC: 3.4 MMOL/L (ref 3.5–5)
SODIUM BLD-SCNC: 142 MMOL/L (ref 132–146)

## 2023-12-23 PROCEDURE — 93298 REM INTERROG DEV EVAL SCRMS: CPT | Performed by: INTERNAL MEDICINE

## 2023-12-23 PROCEDURE — G2066 INTER DEVC REMOTE 30D: HCPCS | Performed by: INTERNAL MEDICINE

## 2024-01-17 ENCOUNTER — TELEPHONE (OUTPATIENT)
Dept: SURGERY | Age: 64
End: 2024-01-17

## 2024-01-17 ENCOUNTER — OFFICE VISIT (OUTPATIENT)
Dept: SURGERY | Age: 64
End: 2024-01-17

## 2024-01-17 ENCOUNTER — PREP FOR PROCEDURE (OUTPATIENT)
Dept: SURGERY | Age: 64
End: 2024-01-17

## 2024-01-17 VITALS — HEIGHT: 63 IN | BODY MASS INDEX: 34.73 KG/M2 | WEIGHT: 196 LBS | RESPIRATION RATE: 16 BRPM

## 2024-01-17 DIAGNOSIS — Z12.11 ENCOUNTER FOR SCREENING COLONOSCOPY: Primary | ICD-10-CM

## 2024-01-17 DIAGNOSIS — Z12.11 SCREEN FOR COLON CANCER: ICD-10-CM

## 2024-01-17 RX ORDER — SODIUM PICOSULFATE, MAGNESIUM OXIDE, AND ANHYDROUS CITRIC ACID 10; 3.5; 12 MG/160ML; G/160ML; G/160ML
1 LIQUID ORAL 2 TIMES DAILY
Qty: 1 EACH | Refills: 0 | Status: SHIPPED | OUTPATIENT
Start: 2024-01-17 | End: 2024-01-18

## 2024-01-17 NOTE — TELEPHONE ENCOUNTER
Scheduled pt for Colonoscopy 3/11/24 at 10AM. Pt needs to arrive at [facility] at 9AM. Patient confirmed date and time, address and directions given in office. Prep instructions given in office, patient understood.      Prior Authorization Form:      DEMOGRAPHICS:                     Patient Name:  Edith Howard  Patient :  1960            Insurance:  Payor: [facility] MEDICAID / Plan: [facility] MEDICAID / Product Type: *No Product type* /   Insurance ID Number:    Payer/Plan Subscr  Sex Relation Sub. Ins. ID Effective Group Num   1. [facility] MED* EDITH HOWARD 1960 Female Self [id number]                                   PO          DIAGNOSIS & PROCEDURE:                       Procedure/Operation: COLONOSCOPY           CPT Code: 32540    Diagnosis:  SCREENING    ICD10 Code: Z12.11    Location:  [facility]    Surgeon:  JUAN JIMENEZ    SCHEDULING INFORMATION:                          Date: 3/11/24    Time: 10AM              Anesthesia:  LMAC                                                       Status:  OUTPATIENT       Special Comments:  N/A       Electronically signed by Carito Wharton MA on 2024 at 1:55 PM

## 2024-01-17 NOTE — PATIENT INSTRUCTIONS
If you have any questions, please call Opal at [phone]    [facility]  CLENPIQ  COLON PREP FOR COLONOSCOPY OR COLON SURGERY    It is very important that you follow all of the instructions listed on this sheet carefully (they may be slightly different than the directions on the product that you purchase at the pharmacy) to ensure that your colon is adequately cleaned out or your risk of complications could be increased.    2 Days or More Before Endoscopy:  Obtain CLENPIQ from the pharmacy.    Do not eat corn, tomatoes, peas or watermelon 3 to 5 days before procedure.  Start clear liquid diet 2 days before the procedure    1 Day Before the Endoscopy:  No solid food - only clear liquids (soup, jello, or juice that you can see through with no solid food) for breakfast, lunch and supper.  DO NOT drink or eat anything that is red as it will turn the inside of the colon red and look like blood.    Have at least 8 oz or more of clear liquids for breakfast (7 am to 8 am) and lunch (11:30 am to 12:30 pm).  Between 5-9 pm, drink 1 bottle of clenpiq  Over the next 5 hours, drink 40 oz (5 cups of clear liquids)  Can continue to take  clear liquids that night    Day of Endoscopy:  4 hours prior to scheduled time for colonoscopy, drink the second bottle of CLENPIQ followed immediately by at least 24 oz of clear liquids.  STOP ALL CLEAR LIQUIDS 2 HOURS PRIOR TO SCHEDULED TIME  If any blood pressure medications or heart medications are due in the morning, you should take them with a sip of water.  Hold AM oral diabetic medications.  Take 1/2 the insulin dose that morning    Instructions for Clear liquid diet  Definition  A clear liquid diet consists of clear liquids, such as water, broth and plain gelatin, that are easily digested and leave no undigested residue in your intestinal tract. Your doctor may prescribe a clear liquid diet before certain medical procedures or if you have certain

## 2024-01-17 NOTE — PROGRESS NOTES
[facility]  HISTORY & PHYSICAL      CC:  Colorectal cancer screening         HPI:     Edith Howard is here as New patient(1/17/2024). The patient was sent here to discuss colorectal cancer screening. The patient denies any weight loss, rectal bleeding, abdominal pain, or change in bowel habits.  There is no family history of IBD or colorectal cancer.  The patient has never had a screening colonoscopy. Pt takes aspirin 81 mg daily  No tobacco abuse    Past Medical History:   Diagnosis Date    Ambulatory dysfunction     B12 deficiency     Diabetes mellitus (HCC)     HTN (hypertension)     Lacunar stroke (HCC)     Macrocytic anemia      Past Surgical History:   Procedure Laterality Date    INSERTABLE CARDIAC MONITOR  10/05/2023    Dr. Diez     Social History     Socioeconomic History    Marital status:      Spouse name: Not on file    Number of children: Not on file    Years of education: Not on file    Highest education level: Not on file   Occupational History    Not on file   Tobacco Use    Smoking status: Never    Smokeless tobacco: Never   Substance and Sexual Activity    Alcohol use: Never    Drug use: Never    Sexual activity: Not on file   Other Topics Concern    Not on file   Social History Narrative    Not on file     Social Determinants of Health     Financial Resource Strain: Low Risk  (11/16/2023)    Overall Financial Resource Strain (CARDIA)     Difficulty of Paying Living Expenses: Not hard at all   Food Insecurity: Not on file (11/16/2023)   Transportation Needs: Unknown (11/16/2023)    PRAPARE - Transportation     Lack of Transportation (Medical): Not on file     Lack of Transportation (Non-Medical): No   Physical Activity: Not on file   Stress: Not on file   Social Connections: Not on file   Intimate Partner Violence: Not on file   Housing Stability: Unknown (11/16/2023)    Housing Stability Vital Sign     Unable to Pay for Housing in the Last Year: Not on file     Number of

## 2024-01-22 ENCOUNTER — OFFICE VISIT (OUTPATIENT)
Dept: INTERNAL MEDICINE | Age: 64
End: 2024-01-22
Payer: MEDICAID

## 2024-01-22 VITALS
BODY MASS INDEX: 33.95 KG/M2 | HEART RATE: 94 BPM | OXYGEN SATURATION: 97 % | HEIGHT: 63 IN | TEMPERATURE: 97.9 F | DIASTOLIC BLOOD PRESSURE: 65 MMHG | WEIGHT: 191.6 LBS | SYSTOLIC BLOOD PRESSURE: 135 MMHG | RESPIRATION RATE: 20 BRPM

## 2024-01-22 DIAGNOSIS — I10 ESSENTIAL (PRIMARY) HYPERTENSION: ICD-10-CM

## 2024-01-22 DIAGNOSIS — Z12.31 ENCOUNTER FOR SCREENING MAMMOGRAM FOR MALIGNANT NEOPLASM OF BREAST: Primary | ICD-10-CM

## 2024-01-22 DIAGNOSIS — E11.65 TYPE 2 DIABETES MELLITUS WITH HYPERGLYCEMIA, WITHOUT LONG-TERM CURRENT USE OF INSULIN (HCC): ICD-10-CM

## 2024-01-22 DIAGNOSIS — I69.30 CEREBRAL MULTI-INFARCT STATE: ICD-10-CM

## 2024-01-22 PROCEDURE — 3075F SYST BP GE 130 - 139MM HG: CPT | Performed by: STUDENT IN AN ORGANIZED HEALTH CARE EDUCATION/TRAINING PROGRAM

## 2024-01-22 PROCEDURE — 99212 OFFICE O/P EST SF 10 MIN: CPT | Performed by: STUDENT IN AN ORGANIZED HEALTH CARE EDUCATION/TRAINING PROGRAM

## 2024-01-22 PROCEDURE — 99213 OFFICE O/P EST LOW 20 MIN: CPT | Performed by: STUDENT IN AN ORGANIZED HEALTH CARE EDUCATION/TRAINING PROGRAM

## 2024-01-22 PROCEDURE — 3078F DIAST BP <80 MM HG: CPT | Performed by: STUDENT IN AN ORGANIZED HEALTH CARE EDUCATION/TRAINING PROGRAM

## 2024-01-22 RX ORDER — AMLODIPINE BESYLATE 10 MG/1
10 TABLET ORAL DAILY
Qty: 90 TABLET | Refills: 1 | Status: SHIPPED | OUTPATIENT
Start: 2024-01-22

## 2024-01-22 ASSESSMENT — ENCOUNTER SYMPTOMS
CONSTIPATION: 0
ABDOMINAL PAIN: 0
BACK PAIN: 0
COUGH: 0
SINUS PAIN: 0
NAUSEA: 0
VOMITING: 0
BLOOD IN STOOL: 0
SHORTNESS OF BREATH: 0
DIARRHEA: 0

## 2024-01-22 ASSESSMENT — PATIENT HEALTH QUESTIONNAIRE - PHQ9
SUM OF ALL RESPONSES TO PHQ QUESTIONS 1-9: 0
SUM OF ALL RESPONSES TO PHQ9 QUESTIONS 1 & 2: 0
SUM OF ALL RESPONSES TO PHQ QUESTIONS 1-9: 0
1. LITTLE INTEREST OR PLEASURE IN DOING THINGS: 0
2. FEELING DOWN, DEPRESSED OR HOPELESS: 0

## 2024-01-22 NOTE — PROGRESS NOTES
[facility]  Internal Medicine Residency Program  ACC Note      SUBJECTIVE:  CC: had concerns including 1 Month Follow-Up and Cough (Patient complains of dry cough ).    HPI:  Edith Howard is a 63 y.o.female with PMH of HTN, DM, HLD, CVA presenting to [facility] for follow up.    She complains of intermittent dry cough for the past few weeks to months. Has been made worse by having heat on in the house which makes the house dry. Denies fever, chills, nasal congestion. Amenable to trying saline spray.     Essential (primary) hypertension: BP remains above goal. She was started on 3 different antihypertensives during her October admission, all at low doses. Patient is interested in consolidating to fewer pills with better control and dose adjustment. Does not own a home BP cuff, refuses to get one. States she does not want to deal with having \"more things to do\".     Uncontrolled type 2 diabetes mellitus with hyperglycemia (HCC): Referred to opthalmology during previous visit for DM retinal exam and states again today that she did not receive a phone call. She also admits that she often ignores calls from numbers she does not recognize. I discussed with her again the importance of answering these called so that she can be seen. She refuses to check her BG at home as she does not want to \"deal with the work.\"     Cerebral multi-infarct state: tolerating statin and aspirin. No recent falls.     Routine Health Maintenance:  Colonoscopy scheduled with Dr. Schumacher 3/11/24  Pap smear and assessment by OBGYN scheduled for 2/5/24  Due for mammography. Agrees to referral today      Review of Systems   Constitutional:  Negative for chills, diaphoresis and fever.   HENT:  Negative for congestion, hearing loss, mouth sores and sinus pain.    Respiratory:  Negative for cough and shortness of breath.    Cardiovascular:  Negative for chest pain, palpitations and leg swelling.   Gastrointestinal:  Negative for abdominal

## 2024-01-24 DIAGNOSIS — Z12.31 ENCOUNTER FOR SCREENING MAMMOGRAM FOR MALIGNANT NEOPLASM OF BREAST: Primary | ICD-10-CM

## 2024-02-05 ENCOUNTER — OFFICE VISIT (OUTPATIENT)
Dept: OBGYN | Age: 64
End: 2024-02-05
Payer: MEDICAID

## 2024-02-05 VITALS
WEIGHT: 193 LBS | SYSTOLIC BLOOD PRESSURE: 140 MMHG | HEART RATE: 96 BPM | DIASTOLIC BLOOD PRESSURE: 81 MMHG | HEIGHT: 64 IN | BODY MASS INDEX: 32.95 KG/M2

## 2024-02-05 DIAGNOSIS — Z00.00 ANNUAL PHYSICAL EXAM: Primary | ICD-10-CM

## 2024-02-05 PROCEDURE — 3077F SYST BP >= 140 MM HG: CPT | Performed by: STUDENT IN AN ORGANIZED HEALTH CARE EDUCATION/TRAINING PROGRAM

## 2024-02-05 PROCEDURE — 3079F DIAST BP 80-89 MM HG: CPT | Performed by: STUDENT IN AN ORGANIZED HEALTH CARE EDUCATION/TRAINING PROGRAM

## 2024-02-05 PROCEDURE — 99386 PREV VISIT NEW AGE 40-64: CPT | Performed by: STUDENT IN AN ORGANIZED HEALTH CARE EDUCATION/TRAINING PROGRAM

## 2024-02-05 NOTE — PROGRESS NOTES
Gynecologic History and Physical       CHIEF COMPLAINT:  Annual exam    HISTORY OF PRESENT ILLNESS:      Edith Howard is a 63 y.o. female, who presents with annual exam, referred by PCP  -doing well today  -no GYN concerns  -Not sure when she went through menopause, but it was years ago and she denies any PMB  -She has not had a PAP smear in years  -She has not had a mammogram in years - she says that she was never called about a mammogram (one had been ordered for her on 24)  -She has some urinary incontinence but it doesn't bother her  -She is not sexually active  -She denies vaginal itching, discharge  -She denies STD history of history of abnormal PAP smears  - - all vaginal          Past Medical History:        Diagnosis Date    Ambulatory dysfunction     B12 deficiency     Diabetes mellitus (HCC)     HTN (hypertension)     Lacunar stroke (HCC)     Macrocytic anemia        Past Surgical History:        Procedure Laterality Date    INSERTABLE CARDIAC MONITOR  10/05/2023    Dr. Diez       Allergies:  Patient has no known allergies.    Social History:    Social History     Socioeconomic History    Marital status:      Spouse name: Not on file    Number of children: Not on file    Years of education: Not on file    Highest education level: Not on file   Occupational History    Not on file   Tobacco Use    Smoking status: Never    Smokeless tobacco: Never   Vaping Use    Vaping Use: Never used   Substance and Sexual Activity    Alcohol use: Never    Drug use: Never    Sexual activity: Not Currently     Partners: Male   Other Topics Concern    Not on file   Social History Narrative    Not on file     Social Determinants of Health     Financial Resource Strain: Low Risk  (2023)    Overall Financial Resource Strain (CARDIA)     Difficulty of Paying Living Expenses: Not hard at all   Food Insecurity: Not on file (2023)   Transportation Needs: Unknown (2023)    PRAPARE -

## 2024-02-09 LAB
GYNECOLOGY CYTOLOGY REPORT: NORMAL
HPV SAMPLE: NORMAL
HPV SOURCE: NORMAL
HPV, GENOTYPE 16: NOT DETECTED
HPV, GENOTYPE 18: NOT DETECTED
HPV, HIGH RISK OTHER: NOT DETECTED
HPV, INTERPRETATION: NORMAL

## 2024-02-16 PROBLEM — Z12.11 SCREEN FOR COLON CANCER: Status: RESOLVED | Noted: 2024-01-17 | Resolved: 2024-02-16

## 2024-02-27 ENCOUNTER — OFFICE VISIT (OUTPATIENT)
Dept: INTERNAL MEDICINE | Age: 64
End: 2024-02-27
Payer: MEDICAID

## 2024-02-27 VITALS
SYSTOLIC BLOOD PRESSURE: 119 MMHG | RESPIRATION RATE: 18 BRPM | BODY MASS INDEX: 33.17 KG/M2 | TEMPERATURE: 98.4 F | DIASTOLIC BLOOD PRESSURE: 75 MMHG | OXYGEN SATURATION: 95 % | HEIGHT: 64 IN | HEART RATE: 86 BPM | WEIGHT: 194.3 LBS

## 2024-02-27 DIAGNOSIS — Z79.899 HIGH RISK MEDICATION USE: ICD-10-CM

## 2024-02-27 DIAGNOSIS — I67.82 CEREBRAL ISCHEMIA: ICD-10-CM

## 2024-02-27 DIAGNOSIS — E53.8 B12 DEFICIENCY: ICD-10-CM

## 2024-02-27 DIAGNOSIS — I69.30 CEREBRAL MULTI-INFARCT STATE: ICD-10-CM

## 2024-02-27 DIAGNOSIS — I10 ESSENTIAL (PRIMARY) HYPERTENSION: ICD-10-CM

## 2024-02-27 DIAGNOSIS — E11.65 TYPE 2 DIABETES MELLITUS WITH HYPERGLYCEMIA, WITHOUT LONG-TERM CURRENT USE OF INSULIN (HCC): Primary | ICD-10-CM

## 2024-02-27 LAB — HBA1C MFR BLD: 6.5 %

## 2024-02-27 PROCEDURE — 83036 HEMOGLOBIN GLYCOSYLATED A1C: CPT | Performed by: STUDENT IN AN ORGANIZED HEALTH CARE EDUCATION/TRAINING PROGRAM

## 2024-02-27 PROCEDURE — 99212 OFFICE O/P EST SF 10 MIN: CPT | Performed by: STUDENT IN AN ORGANIZED HEALTH CARE EDUCATION/TRAINING PROGRAM

## 2024-02-27 PROCEDURE — 3044F HG A1C LEVEL LT 7.0%: CPT | Performed by: STUDENT IN AN ORGANIZED HEALTH CARE EDUCATION/TRAINING PROGRAM

## 2024-02-27 PROCEDURE — 3078F DIAST BP <80 MM HG: CPT | Performed by: INTERNAL MEDICINE

## 2024-02-27 PROCEDURE — 99214 OFFICE O/P EST MOD 30 MIN: CPT | Performed by: STUDENT IN AN ORGANIZED HEALTH CARE EDUCATION/TRAINING PROGRAM

## 2024-02-27 PROCEDURE — 3074F SYST BP LT 130 MM HG: CPT | Performed by: STUDENT IN AN ORGANIZED HEALTH CARE EDUCATION/TRAINING PROGRAM

## 2024-02-27 RX ORDER — LOSARTAN POTASSIUM 25 MG/1
TABLET ORAL
Qty: 30 TABLET | Refills: 4 | Status: CANCELLED | OUTPATIENT
Start: 2024-02-27

## 2024-02-27 ASSESSMENT — ENCOUNTER SYMPTOMS
ABDOMINAL PAIN: 0
COUGH: 0
SHORTNESS OF BREATH: 0
DIARRHEA: 0
NAUSEA: 0
BLOOD IN STOOL: 0
VOMITING: 0
SINUS PAIN: 0
CONSTIPATION: 0
BACK PAIN: 0

## 2024-02-27 NOTE — PROGRESS NOTES
[facility]  Internal Medicine Residency Clinic    Attending Physician Statement  I have discussed the case, including pertinent history and exam findings with the resident physician.  I agree with the assessment, plan and orders as documented by the resident. I have reviewed the relevant PMHx, PSHx, FamHx, SocialHx, medications, and allergies and updated history as appropriate.    Patient presents for routine follow up of medical problems.  Lives with daughter who assists with IADLs, patient completes all ADLS.    HTN   Controlled on current regimen losartan 25 mg, amlodipine 10 mg     Hx ischemic stroke   Continues on aspirin, statin   Uses walker for ambulation.    DM 2   Last A1c 7.9%   Continues on metformin 500 mg BID    Chronic OA b/l knees   Takes chronic tylenol 325 mg    Vitamin B12 def   Previously on IM injections but need to reassess level.    Screening:   Colorectal screening scheduled   Mammogram to be scheduled    Remainder of medical problems as per resident note.    Celso Jacome,   2/27/2024 4:16 PM    
and neck pain.   Neurological:  Negative for dizziness, seizures, weakness and light-headedness.       Outpatient Medications Marked as Taking for the 2/27/24 encounter (Office Visit) with Elisa Tobar MD   Medication Sig Dispense Refill    aspirin (ASPIRIN LOW DOSE) 81 MG EC tablet Take 1 tablet by mouth daily 90 tablet 1    losartan (COZAAR) 25 MG tablet Take 1 tablet by mouth daily 90 tablet 1    metFORMIN (GLUCOPHAGE) 500 MG tablet Take 1 tablet by mouth 2 times daily 180 tablet 1    rosuvastatin (CRESTOR) 40 MG tablet Take 1 tablet by mouth nightly 90 tablet 1    vitamin B-12 (CYANOCOBALAMIN) 1000 MCG tablet Take 1 tablet by mouth daily 90 tablet 1    amLODIPine (NORVASC) 10 MG tablet Take 1 tablet by mouth daily 90 tablet 1    acetaminophen (TYLENOL) 325 MG tablet TAKE 1 TABLET BY MOUTH EVERY 6 HOURS AS NEEDED FOR PAIN 120 tablet 4       OBJECTIVE:    VS:   /75 (Site: Left Upper Arm, Position: Sitting, Cuff Size: Large Adult)   Pulse 86   Temp 98.4 °F (36.9 °C) (Temporal)   Resp 18   Ht 1.626 m (5' 4\")   Wt 88.1 kg (194 lb 4.8 oz)   SpO2 95%   BMI 33.35 kg/m²     EXAM:  Physical Exam  Constitutional:       Appearance: Normal appearance. She is not ill-appearing or diaphoretic.   HENT:      Head: Normocephalic and atraumatic.   Cardiovascular:      Rate and Rhythm: Normal rate and regular rhythm.      Heart sounds: No murmur heard.     No friction rub. No gallop.   Pulmonary:      Effort: No respiratory distress.      Breath sounds: No stridor. No wheezing, rhonchi or rales.   Abdominal:      General: Bowel sounds are normal. There is no distension.      Palpations: Abdomen is soft. There is no mass.      Tenderness: There is no abdominal tenderness. There is no guarding or rebound.      Hernia: No hernia is present.   Musculoskeletal:         General: No swelling or tenderness.      Right lower leg: No edema.      Left lower leg: No edema.   Skin:     Coloration: Skin is not jaundiced.

## 2024-02-28 RX ORDER — ASPIRIN 81 MG/1
81 TABLET ORAL DAILY
Qty: 90 TABLET | Refills: 1 | Status: SHIPPED | OUTPATIENT
Start: 2024-02-28

## 2024-02-28 RX ORDER — LANOLIN ALCOHOL/MO/W.PET/CERES
1000 CREAM (GRAM) TOPICAL DAILY
Qty: 90 TABLET | Refills: 1 | Status: SHIPPED | OUTPATIENT
Start: 2024-02-28

## 2024-02-28 RX ORDER — ROSUVASTATIN CALCIUM 40 MG/1
40 TABLET, COATED ORAL NIGHTLY
Qty: 90 TABLET | Refills: 1 | Status: SHIPPED | OUTPATIENT
Start: 2024-02-28

## 2024-02-28 RX ORDER — LOSARTAN POTASSIUM 25 MG/1
25 TABLET ORAL DAILY
Qty: 90 TABLET | Refills: 1 | Status: SHIPPED | OUTPATIENT
Start: 2024-02-28

## 2024-03-05 NOTE — PROGRESS NOTES
[facility] PRE-ADMISSION TESTING   COLONOSCOPY INSTRUCTIONS  PAT- Phone Number: [phone]    COLONOSCOPY INSTRUCTIONS:     [x] Bowel Prep instructions reviewed - any questions regarding your prep, please contact surgeon's office.  [x] Colonoscopy: 1-2 days prior: No solid foods. Clear liquids only - nothing red or purple in color.  [x] Antibacterial Soap Shower Night before or morning of procedure.  [x] Nothing by mouth after midnight. Including no gum, candy, mints, or water.   [x] Do not wear makeup, lotions, powders, deodorant.   [x] No tobacco products, illegal drugs, or alcohol within 24 hours of your surgery.  [x] Jewelry or valuables should not be brought to the hospital. All body and/or tongue piercing's must be removed prior to arriving to hospital. No contact lens or hair pins.   [] Urine Pregnancy test will be preformed the day of surgery. A specimen sample may be brought from home.  [x] Arrange transportation with a responsible adult  to and from the hospital. If you do not have a responsible adult  to transport you, you will need to make arrangements with a medical transportation company. Arrange for someone to be with you for the remainder of the day and for 24 hours after your procedure due to having had anesthesia.    -Who will be your  for transportation? son  -Who will be staying with you for 24 hrs after your procedure? son  [x] Bring insurance card and photo ID.  [] Bring copy of living will or healthcare power of  papers to be placed in your electronic record.    PARKING INSTRUCTIONS:     [x] ARRIVAL DATE & TIME: 3/11 at 0845  [x] Times are subject to change. We will contact you the business day before surgery if that were to occur.  [x] Enter into the [facility] Entrance. Two people may accompany you. Masks are not required.  [x] Parking Lot \"I\" is where you will park. It is located on the corner of [facility]

## 2024-03-07 PROBLEM — Z12.11 SCREEN FOR COLON CANCER: Status: ACTIVE | Noted: 2024-01-17

## 2024-03-08 NOTE — PROGRESS NOTES
Called patient to notify her of time change for procedure scheduled on 3/11.  Scheduled at 9:29 am.  Arrival time 8:15 am.  Patient verbalized understanding.

## 2024-03-11 ENCOUNTER — ANESTHESIA (OUTPATIENT)
Dept: ENDOSCOPY | Age: 64
End: 2024-03-11
Payer: MEDICAID

## 2024-03-11 ENCOUNTER — ANESTHESIA EVENT (OUTPATIENT)
Dept: ENDOSCOPY | Age: 64
End: 2024-03-11
Payer: MEDICAID

## 2024-03-11 ENCOUNTER — HOSPITAL ENCOUNTER (OUTPATIENT)
Age: 64
Setting detail: OUTPATIENT SURGERY
Discharge: HOME | End: 2024-03-11
Attending: SURGERY | Admitting: SURGERY
Payer: MEDICAID

## 2024-03-11 VITALS
RESPIRATION RATE: 16 BRPM | TEMPERATURE: 97.7 F | HEIGHT: 64 IN | HEART RATE: 80 BPM | WEIGHT: 194 LBS | OXYGEN SATURATION: 97 % | SYSTOLIC BLOOD PRESSURE: 140 MMHG | BODY MASS INDEX: 33.12 KG/M2 | DIASTOLIC BLOOD PRESSURE: 80 MMHG

## 2024-03-11 DIAGNOSIS — Z01.812 PRE-OPERATIVE LABORATORY EXAMINATION: Primary | ICD-10-CM

## 2024-03-11 LAB — GLUCOSE BLD-MCNC: 124 MG/DL (ref 74–99)

## 2024-03-11 PROCEDURE — 2580000003 HC RX 258: Performed by: SURGERY

## 2024-03-11 PROCEDURE — 2709999900 HC NON-CHARGEABLE SUPPLY: Performed by: SURGERY

## 2024-03-11 PROCEDURE — 3700000001 HC ADD 15 MINUTES (ANESTHESIA): Performed by: SURGERY

## 2024-03-11 PROCEDURE — 3700000000 HC ANESTHESIA ATTENDED CARE: Performed by: SURGERY

## 2024-03-11 PROCEDURE — 7100000010 HC PHASE II RECOVERY - FIRST 15 MIN: Performed by: SURGERY

## 2024-03-11 PROCEDURE — 6360000002 HC RX W HCPCS

## 2024-03-11 PROCEDURE — 3609027000 HC COLONOSCOPY: Performed by: SURGERY

## 2024-03-11 PROCEDURE — 45378 DIAGNOSTIC COLONOSCOPY: CPT | Performed by: SURGERY

## 2024-03-11 PROCEDURE — 82962 GLUCOSE BLOOD TEST: CPT

## 2024-03-11 PROCEDURE — 7100000011 HC PHASE II RECOVERY - ADDTL 15 MIN: Performed by: SURGERY

## 2024-03-11 RX ORDER — SODIUM CHLORIDE 0.9 % (FLUSH) 0.9 %
5-40 SYRINGE (ML) INJECTION PRN
Status: DISCONTINUED | OUTPATIENT
Start: 2024-03-11 | End: 2024-03-11 | Stop reason: HOSPADM

## 2024-03-11 RX ORDER — SODIUM CHLORIDE 9 MG/ML
INJECTION, SOLUTION INTRAVENOUS PRN
Status: DISCONTINUED | OUTPATIENT
Start: 2024-03-11 | End: 2024-03-11 | Stop reason: HOSPADM

## 2024-03-11 RX ORDER — SODIUM CHLORIDE 9 MG/ML
INJECTION, SOLUTION INTRAVENOUS CONTINUOUS
Status: DISCONTINUED | OUTPATIENT
Start: 2024-03-11 | End: 2024-03-11 | Stop reason: HOSPADM

## 2024-03-11 RX ORDER — PROPOFOL 10 MG/ML
INJECTION, EMULSION INTRAVENOUS PRN
Status: DISCONTINUED | OUTPATIENT
Start: 2024-03-11 | End: 2024-03-11 | Stop reason: SDUPTHER

## 2024-03-11 RX ORDER — SODIUM CHLORIDE 0.9 % (FLUSH) 0.9 %
5-40 SYRINGE (ML) INJECTION EVERY 12 HOURS SCHEDULED
Status: DISCONTINUED | OUTPATIENT
Start: 2024-03-11 | End: 2024-03-11 | Stop reason: HOSPADM

## 2024-03-11 RX ADMIN — PROPOFOL 220 MG: 10 INJECTION, EMULSION INTRAVENOUS at 09:37

## 2024-03-11 RX ADMIN — SODIUM CHLORIDE: 9 INJECTION, SOLUTION INTRAVENOUS at 09:33

## 2024-03-11 RX ADMIN — SODIUM CHLORIDE: 9 INJECTION, SOLUTION INTRAVENOUS at 08:50

## 2024-03-11 ASSESSMENT — PAIN - FUNCTIONAL ASSESSMENT: PAIN_FUNCTIONAL_ASSESSMENT: NONE - DENIES PAIN

## 2024-03-11 ASSESSMENT — LIFESTYLE VARIABLES: SMOKING_STATUS: 0

## 2024-03-11 NOTE — ANESTHESIA POSTPROCEDURE EVALUATION
Department of Anesthesiology  Postprocedure Note    Patient: Edith Howard  MRN: [id number]  YOB: 1960  Date of evaluation: 3/11/2024    Procedure Summary       Date: 03/11/24 Room / Location: [place] [address] [facility]    Anesthesia Start: 0928 Anesthesia Stop: 0953    Procedure: COLORECTAL CANCER SCREENING, NOT HIGH RISK Diagnosis:       Screen for colon cancer      (Screen for colon cancer [Z12.11])    Surgeons: John Schumacher MD Responsible Provider: Ermias Velazco MD    Anesthesia Type: MAC ASA Status: 3            Anesthesia Type: MAC    Gely Phase I: Gely Score: 10    Gely Phase II: Gely Score: 10    Anesthesia Post Evaluation    Patient location during evaluation: PACU  Patient participation: complete - patient participated  Level of consciousness: awake  Pain score: 3  Airway patency: patent  Nausea & Vomiting: no nausea and no vomiting  Cardiovascular status: blood pressure returned to baseline  Respiratory status: acceptable  Hydration status: euvolemic    No notable events documented.

## 2024-03-11 NOTE — ANESTHESIA PRE PROCEDURE
Department of Anesthesiology  Preprocedure Note       Name:  Edith Howard   Age:  63 y.o.  :  1960                                          MRN:  [id number]         Date:  3/11/2024      Surgeon: Surgeon(s):  John Schumacher MD    Procedure: Procedure(s):  COLORECTAL CANCER SCREENING, NOT HIGH RISK    Medications prior to admission:   Prior to Admission medications    Medication Sig Start Date End Date Taking? Authorizing Provider   aspirin (ASPIRIN LOW DOSE) 81 MG EC tablet Take 1 tablet by mouth daily 24   Elisa Tobar MD   losartan (COZAAR) 25 MG tablet Take 1 tablet by mouth daily 24   Elisa Tobar MD   metFORMIN (GLUCOPHAGE) 500 MG tablet Take 1 tablet by mouth 2 times daily 24   Elisa Tobar MD   rosuvastatin (CRESTOR) 40 MG tablet Take 1 tablet by mouth nightly 24   Elisa Tobar MD   vitamin B-12 (CYANOCOBALAMIN) 1000 MCG tablet Take 1 tablet by mouth daily 24   Elisa Tobar MD   amLODIPine (NORVASC) 10 MG tablet Take 1 tablet by mouth daily 24   Elisa Tobar MD   acetaminophen (TYLENOL) 325 MG tablet TAKE 1 TABLET BY MOUTH EVERY 6 HOURS AS NEEDED FOR PAIN 23   Chiara Quiroz MD       Current medications:    Current Facility-Administered Medications   Medication Dose Route Frequency Provider Last Rate Last Admin    0.9 % sodium chloride infusion   IntraVENous Continuous John Schumacher MD        sodium chloride flush 0.9 % injection 5-40 mL  5-40 mL IntraVENous 2 times per day John Schumacher MD        sodium chloride flush 0.9 % injection 5-40 mL  5-40 mL IntraVENous PRN John Schumacher MD        0.9 % sodium chloride infusion   IntraVENous PRN John Schumacher MD           Allergies:  No Known Allergies    Problem List:    Patient Active Problem List   Diagnosis Code    Other lack of coordination R27.8    Acute stroke due to ischemia (HCC) I63.9    Essential (primary) hypertension I10    Uncontrolled type 2

## 2024-03-11 NOTE — H&P
clear liquids prior to procedure  clenpiq split dose prep  Schedule at [facility]        John Schumacher MD, FACS  3/11/2024  9:29 AM

## 2024-03-19 RX ORDER — HYDRALAZINE HYDROCHLORIDE 25 MG/1
25 TABLET, FILM COATED ORAL EVERY 8 HOURS SCHEDULED
Qty: 90 TABLET | Refills: 4 | OUTPATIENT
Start: 2024-03-19

## 2024-03-19 NOTE — TELEPHONE ENCOUNTER
Last Appointment:  Visit date not found  Future Appointments   Date Time Provider Department Center   4/1/2024  2:00 PM Elisa Tobar MD ACC  [facility]      Hydralazine discontinued  Next appt 4/1/24

## 2024-04-01 ENCOUNTER — OFFICE VISIT (OUTPATIENT)
Dept: INTERNAL MEDICINE | Age: 64
End: 2024-04-01
Payer: MEDICAID

## 2024-04-01 ENCOUNTER — HOSPITAL ENCOUNTER (OUTPATIENT)
Age: 64
Discharge: HOME | End: 2024-04-01
Payer: MEDICAID

## 2024-04-01 VITALS
TEMPERATURE: 97.8 F | SYSTOLIC BLOOD PRESSURE: 120 MMHG | OXYGEN SATURATION: 97 % | WEIGHT: 200 LBS | HEIGHT: 63 IN | BODY MASS INDEX: 35.44 KG/M2 | DIASTOLIC BLOOD PRESSURE: 78 MMHG | RESPIRATION RATE: 16 BRPM | HEART RATE: 91 BPM

## 2024-04-01 DIAGNOSIS — Z79.899 HIGH RISK MEDICATION USE: ICD-10-CM

## 2024-04-01 DIAGNOSIS — Q20.8 ABNORMALITY OF LEFT VENTRICULAR OUTFLOW TRACT: Primary | ICD-10-CM

## 2024-04-01 DIAGNOSIS — E11.65 TYPE 2 DIABETES MELLITUS WITH HYPERGLYCEMIA, WITHOUT LONG-TERM CURRENT USE OF INSULIN (HCC): ICD-10-CM

## 2024-04-01 DIAGNOSIS — E53.8 B12 DEFICIENCY: ICD-10-CM

## 2024-04-01 LAB
ALBUMIN SERPL-MCNC: 4.5 G/DL (ref 3.5–5.2)
ALP SERPL-CCNC: 109 U/L (ref 35–104)
ALT SERPL-CCNC: 17 U/L (ref 0–32)
AST SERPL-CCNC: 13 U/L (ref 0–31)
BILIRUB DIRECT SERPL-MCNC: <0.2 MG/DL (ref 0–0.3)
BILIRUB INDIRECT SERPL-MCNC: ABNORMAL MG/DL (ref 0–1)
BILIRUB SERPL-MCNC: 0.4 MG/DL (ref 0–1.2)
FOLATE SERPL-MCNC: 7 NG/ML (ref 4.8–24.2)
PROT SERPL-MCNC: 7.9 G/DL (ref 6.4–8.3)
VIT B12 SERPL-MCNC: 823 PG/ML (ref 211–946)

## 2024-04-01 PROCEDURE — 99213 OFFICE O/P EST LOW 20 MIN: CPT | Performed by: STUDENT IN AN ORGANIZED HEALTH CARE EDUCATION/TRAINING PROGRAM

## 2024-04-01 PROCEDURE — 99212 OFFICE O/P EST SF 10 MIN: CPT | Performed by: STUDENT IN AN ORGANIZED HEALTH CARE EDUCATION/TRAINING PROGRAM

## 2024-04-01 PROCEDURE — 3074F SYST BP LT 130 MM HG: CPT | Performed by: STUDENT IN AN ORGANIZED HEALTH CARE EDUCATION/TRAINING PROGRAM

## 2024-04-01 PROCEDURE — 80076 HEPATIC FUNCTION PANEL: CPT

## 2024-04-01 PROCEDURE — 82607 VITAMIN B-12: CPT

## 2024-04-01 PROCEDURE — 82746 ASSAY OF FOLIC ACID SERUM: CPT

## 2024-04-01 PROCEDURE — 36415 COLL VENOUS BLD VENIPUNCTURE: CPT

## 2024-04-01 PROCEDURE — 3078F DIAST BP <80 MM HG: CPT | Performed by: INTERNAL MEDICINE

## 2024-04-01 NOTE — PROGRESS NOTES
[facility]  Internal Medicine Residency Program  ACC Note      SUBJECTIVE:  CC: had concerns including Diabetes Mellitus.    HPI:  Edith Howard is a 63 y.o.female with PMH of HTN, DM, HLD, CVA presenting to [facility] for 1 month follow up.     Lab work ordered last visit to assess liver function has not been completed. She continues to take tylenol daily. She says she mostly takes it every 6 hours, but occasionally takes a double dose for worse pain and then compensates by going longer for another dose. She states that she is trying to be more mindful after our last visit when we discussed the affects of tylenol on the liver.    Mammogram ordered in January is still not scheduled. Scheduling is difficult as the patient often does not answer her phone, by choice.     Patient seen by cardiologist Dr. Trejo during October hospitalization. He recommended outpatient follow up and outpatient cardiac MRI after she was found to have LVOT on echo. She did not schedule this, but agrees to referral to Dr. Trejo.     HTN: BP at goal today. She does not want to have a home BP cuff.        Routine health maintenance:  Colonscopy with Dr. Schumacher performed 3/11/24, normal result, 10 years for next screening  Completed cervical cancer screening with OB/GYN on 2/5/2024.  Normal cells on Pap smear.  HPV negative    Review of Systems   Constitutional:  Negative for chills, diaphoresis and fever.   HENT:  Negative for congestion, hearing loss, mouth sores and sinus pain.    Respiratory:  Negative for cough and shortness of breath.    Cardiovascular:  Negative for chest pain, palpitations and leg swelling.   Gastrointestinal:  Negative for abdominal pain, blood in stool, constipation, diarrhea, nausea and vomiting.   Genitourinary:  Negative for dysuria, frequency, hematuria and urgency.   Musculoskeletal:  Negative for arthralgias, back pain and neck pain.   Neurological:  Negative for dizziness, seizures,

## 2024-04-01 NOTE — PROGRESS NOTES
[facility]  Internal Medicine Residency Clinic    Attending Physician Statement  I have discussed the case, including pertinent history and exam findings with the resident physician.  I agree with the assessment, plan and orders as documented by the resident. I have reviewed all pertinent PMHx, PSHx, FamHx, SocialHx, medications, and allergies and updated history as appropriate.    Patient here for routine follow up of medical problems.     NIDDM2  -A1c 6.7; continue metformin   -blood work ordered and pending     HTN  -controlled; The current medical regimen is effective;  continue present plan and medications.  -no orthostatic hypotension or hypertensive symptoms     Lumbar Spinal Pain   -controlled; continue topical and oral pain management     HCM  -up to date on her screenings except for mammogram   -mammogram ordered and pending     Remainder of medical problems as per resident note.    John Yoo Jr, DO  4/1/24

## 2024-04-02 ASSESSMENT — ENCOUNTER SYMPTOMS
BACK PAIN: 0
COUGH: 0
NAUSEA: 0
SINUS PAIN: 0
CONSTIPATION: 0
DIARRHEA: 0
BLOOD IN STOOL: 0

## 2024-04-09 NOTE — PROGRESS NOTES
OUTPATIENT CARDIOLOGY FOLLOW-UP    Name: Edith Howard    Age: 63 y.o.    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     Interim History:   Edith Howard is a 63 y.o.  female known to Dr. Trejo - seen inpatient 10/4/2023 -was planned for outpatient cardiac MRI on 10/26/23 but did not show (was still in [facility] at that time).    Edith presents today for her overdue post hospital visit.  She is mostly sedentary -  living with her daughter in a duplex on the top floor.  This requires her to got up about 14 steps to go in and out.  She ambulates with a walker.  Her daughter does all the shopping. She denies any chest pain, JACQUES when climbing steps.  She does get fatigued.  +  rare palpitations.  Denies  dizziness, syncope, falls.  Denies indigestion, abdominal bloating, early satiety, orthopnea (sleeps on 1 pillow), PND, snoring, lower extremity swelling, black tarry or bloody stools, bloody urine, vaginal bleeding, nose bleeds.  She does not weigh herself.  She takes her medications that her daughter gives her, but does not know the names.  She eats & drinks well throughout the day.  She is leaving for vacation - flying to [place] on June 13 for 2 weeks.  Euvolemic on exam.     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  Per [facility] 10/2/2023.    Was planned for outpatient cardiac MRI on 10/26/23 but did not show (was still in [facility] at that time).   S/p [employer]  Reveal LINQ II Insertable Cardiac

## 2024-04-10 ENCOUNTER — OFFICE VISIT (OUTPATIENT)
Dept: CARDIOLOGY CLINIC | Age: 64
End: 2024-04-10
Payer: MEDICAID

## 2024-04-10 VITALS
SYSTOLIC BLOOD PRESSURE: 136 MMHG | DIASTOLIC BLOOD PRESSURE: 64 MMHG | BODY MASS INDEX: 35.44 KG/M2 | RESPIRATION RATE: 18 BRPM | OXYGEN SATURATION: 96 % | WEIGHT: 200 LBS | HEIGHT: 63 IN | HEART RATE: 105 BPM

## 2024-04-10 DIAGNOSIS — Q20.8 ABNORMALITY OF LEFT VENTRICULAR OUTFLOW TRACT: Primary | ICD-10-CM

## 2024-04-10 PROBLEM — Z01.812 PRE-OPERATIVE LABORATORY EXAMINATION: Status: RESOLVED | Noted: 2024-01-17 | Resolved: 2024-04-10

## 2024-04-10 PROCEDURE — 93000 ELECTROCARDIOGRAM COMPLETE: CPT | Performed by: CLINICAL NURSE SPECIALIST

## 2024-04-10 PROCEDURE — 3078F DIAST BP <80 MM HG: CPT | Performed by: CLINICAL NURSE SPECIALIST

## 2024-04-10 PROCEDURE — 99213 OFFICE O/P EST LOW 20 MIN: CPT | Performed by: CLINICAL NURSE SPECIALIST

## 2024-04-10 PROCEDURE — 3075F SYST BP GE 130 - 139MM HG: CPT | Performed by: CLINICAL NURSE SPECIALIST

## 2024-04-10 NOTE — PATIENT INSTRUCTIONS
We will get you scheduled for Cardiac MRI - follow up in office after test is completed and read.     Continue current medications     Watch diet - try to eat less cheese / processed foods.

## 2024-04-17 RX ORDER — HYDRALAZINE HYDROCHLORIDE 25 MG/1
25 TABLET, FILM COATED ORAL EVERY 8 HOURS SCHEDULED
Qty: 90 TABLET | Refills: 4 | OUTPATIENT
Start: 2024-04-17

## 2024-04-18 RX ORDER — ACETAMINOPHEN 325 MG/1
325 TABLET ORAL EVERY 6 HOURS PRN
Qty: 120 TABLET | Refills: 4 | OUTPATIENT
Start: 2024-04-18

## 2024-04-18 RX ORDER — HYDRALAZINE HYDROCHLORIDE 25 MG/1
25 TABLET, FILM COATED ORAL EVERY 8 HOURS SCHEDULED
Qty: 90 TABLET | Refills: 4 | OUTPATIENT
Start: 2024-04-18

## 2024-04-19 RX ORDER — ACETAMINOPHEN 325 MG/1
325 TABLET ORAL EVERY 6 HOURS PRN
Qty: 120 TABLET | Refills: 2 | Status: SHIPPED | OUTPATIENT
Start: 2024-04-19

## 2024-05-17 DIAGNOSIS — E53.8 B12 DEFICIENCY: ICD-10-CM

## 2024-05-17 RX ORDER — LANOLIN ALCOHOL/MO/W.PET/CERES
1000 CREAM (GRAM) TOPICAL DAILY
Qty: 30 TABLET | Refills: 1 | OUTPATIENT
Start: 2024-05-17

## 2024-06-17 DIAGNOSIS — E53.8 B12 DEFICIENCY: ICD-10-CM

## 2024-06-17 RX ORDER — LANOLIN ALCOHOL/MO/W.PET/CERES
1000 CREAM (GRAM) TOPICAL DAILY
Qty: 30 TABLET | Refills: 1 | Status: SHIPPED | OUTPATIENT
Start: 2024-06-17

## 2024-06-17 NOTE — TELEPHONE ENCOUNTER
Last Appointment:  4/1/2024  Future Appointments   Date Time Provider Department Center   7/8/2024  1:00 PM HEATHER LEOS RM 3 [facility]ATHER [facility] Rad/Car

## 2024-06-18 DIAGNOSIS — I10 ESSENTIAL (PRIMARY) HYPERTENSION: ICD-10-CM

## 2024-06-18 RX ORDER — AMLODIPINE BESYLATE 10 MG/1
10 TABLET ORAL DAILY
Qty: 30 TABLET | Refills: 5 | Status: SHIPPED | OUTPATIENT
Start: 2024-06-18

## 2024-06-19 ENCOUNTER — TELEPHONE (OUTPATIENT)
Dept: INTERNAL MEDICINE | Age: 64
End: 2024-06-19

## 2024-06-19 NOTE — TELEPHONE ENCOUNTER
Left a message for patient informing her of the day and time of her appt with her new doctor and also that an appt card is being mailed to her and she can call the office to change the appt if needed

## 2024-07-08 ENCOUNTER — HOSPITAL ENCOUNTER (OUTPATIENT)
Dept: GENERAL RADIOLOGY | Age: 64
Discharge: HOME | End: 2024-07-10
Payer: MEDICAID

## 2024-07-08 VITALS — BODY MASS INDEX: 34.54 KG/M2 | WEIGHT: 195 LBS

## 2024-07-08 DIAGNOSIS — Z12.31 ENCOUNTER FOR SCREENING MAMMOGRAM FOR MALIGNANT NEOPLASM OF BREAST: ICD-10-CM

## 2024-07-08 PROCEDURE — 77063 BREAST TOMOSYNTHESIS BI: CPT

## 2024-07-15 ENCOUNTER — OFFICE VISIT (OUTPATIENT)
Dept: INTERNAL MEDICINE | Age: 64
End: 2024-07-15
Payer: MEDICAID

## 2024-07-15 VITALS
BODY MASS INDEX: 35.07 KG/M2 | RESPIRATION RATE: 16 BRPM | HEART RATE: 101 BPM | TEMPERATURE: 98 F | OXYGEN SATURATION: 95 % | SYSTOLIC BLOOD PRESSURE: 111 MMHG | WEIGHT: 197.9 LBS | HEIGHT: 63 IN | DIASTOLIC BLOOD PRESSURE: 57 MMHG

## 2024-07-15 DIAGNOSIS — E11.65 TYPE 2 DIABETES MELLITUS WITH HYPERGLYCEMIA, WITHOUT LONG-TERM CURRENT USE OF INSULIN (HCC): Primary | ICD-10-CM

## 2024-07-15 DIAGNOSIS — I69.30 CEREBRAL MULTI-INFARCT STATE: ICD-10-CM

## 2024-07-15 DIAGNOSIS — I10 ESSENTIAL (PRIMARY) HYPERTENSION: ICD-10-CM

## 2024-07-15 PROCEDURE — 3044F HG A1C LEVEL LT 7.0%: CPT

## 2024-07-15 PROCEDURE — 3074F SYST BP LT 130 MM HG: CPT

## 2024-07-15 PROCEDURE — 99212 OFFICE O/P EST SF 10 MIN: CPT

## 2024-07-15 PROCEDURE — 99213 OFFICE O/P EST LOW 20 MIN: CPT

## 2024-07-15 PROCEDURE — 3078F DIAST BP <80 MM HG: CPT

## 2024-07-15 RX ORDER — LOSARTAN POTASSIUM 25 MG/1
25 TABLET ORAL DAILY
Qty: 90 TABLET | Refills: 1 | Status: SHIPPED | OUTPATIENT
Start: 2024-07-15

## 2024-07-15 RX ORDER — ROSUVASTATIN CALCIUM 40 MG/1
40 TABLET, COATED ORAL NIGHTLY
Qty: 90 TABLET | Refills: 1 | Status: SHIPPED | OUTPATIENT
Start: 2024-07-15

## 2024-07-15 RX ORDER — ASPIRIN 81 MG/1
81 TABLET ORAL DAILY
Qty: 90 TABLET | Refills: 1 | Status: SHIPPED | OUTPATIENT
Start: 2024-07-15

## 2024-07-15 ASSESSMENT — ENCOUNTER SYMPTOMS
BACK PAIN: 1
CONSTIPATION: 0
NAUSEA: 0
DIARRHEA: 0
ABDOMINAL PAIN: 0
BLOOD IN STOOL: 0
COUGH: 1
SHORTNESS OF BREATH: 0
VOMITING: 0

## 2024-07-15 NOTE — PATIENT INSTRUCTIONS
Dear Edith Howard,        Thank you for coming to your appointment today. I hope we have addressed all of your needs.       Please make sure to do the following:  - Continue your medications as listed.  - Get labs drawn before our next follow up. We will call you with the results   - We will see each other again in 3 months    Call for a sooner appointment if you develop any worsening symptoms    Have a great day!        Sincerely,  Leslee Cook MD  7/15/2024  3:26 PM

## 2024-07-15 NOTE — PROGRESS NOTES
[facility]  Internal Medicine Residency Clinic    Attending Physician Statement  I have discussed the case, including pertinent history and exam findings with the resident physician.  I agree with the assessment, plan and orders as documented by the resident. I have reviewed the relevant PMHx, PSHx, FamHx, SocialHx, medications, and allergies and updated history as appropriate.    Patient presents for routine follow up of medical problems.   No acute complaints today    Med refills planned    HCM discussion recommended- shingles vaccine and RSV closer to RSV season per CDC recommendations     Hypertension- at goal per ACC/AHA    Tolerating regimen of amlodipine and losartan   Renal function stable   Continue current management     Hyperlipidemia    High intensity statin with HTN/DM    Continue current management     Type II DM    A1c at goal 6.5%   Continue 6 month monitoring   Continue metformin   Microalbumin/Cr ratio overdue- re-order today- previous levels unremarkable     LVOT concerns    ECHO in Oct 2023:     Summary     Normal left ventricular chamber size.     Normal left ventricular systolic function.     Moderate concentric LVH with asymmetric septal hypertrophy. EF visually 60%    Elevated LVOT gradients with possible chordal DANISH. Peak gradient is 59 mm Hg.     Consider further evaluation with cardiac MRI if hypertrophic/ infiltrative cardiomyopathy is being considered.   Cardiac MRI was scheduled for 10/26- patient no showed appt- need to reschedule and follow with Cardiology   Denies any current symptoms        Remainder of medical problems as per resident note.    Stevo Thakkar MD, FACP   7/15/2024 3:17 PM    
appointment in April    Vitamin B12 deficiency  On Vitamin B12 1000 mcg daily  B12 level 4/2024: 823  Patient wants to continue taking vit B12    Health Maintenance/Social Determinants:   Diabetic retinal exam  Diabetic albumin creatinine ratio  Shingles vaccine- recommended   RSV vaccine- Recommended in fall  COVID-19 vaccine    Review of Systems   Constitutional:  Negative for fever and unexpected weight change.   HENT:  Positive for sneezing.    Respiratory:  Positive for cough. Negative for shortness of breath.    Cardiovascular:  Negative for chest pain and leg swelling.   Gastrointestinal:  Negative for abdominal pain, blood in stool, constipation, diarrhea, nausea and vomiting.   Genitourinary:  Negative for difficulty urinating.   Musculoskeletal:  Positive for arthralgias and back pain.   Neurological:  Negative for dizziness, light-headedness and headaches.   Psychiatric/Behavioral:  Negative for dysphoric mood. The patient is not nervous/anxious.              Current Outpatient Medications on File Prior to Visit   Medication Sig Dispense Refill    amLODIPine (NORVASC) 10 MG tablet TAKE 1 TABLET BY MOUTH DAILY 30 tablet 5    vitamin B-12 (CYANOCOBALAMIN) 1000 MCG tablet TAKE 1 TABLET BY MOUTH DAILY 30 tablet 1    acetaminophen (TYLENOL) 325 MG tablet Take 1 tablet by mouth every 6 hours as needed for Pain 120 tablet 2     No current facility-administered medications on file prior to visit.       OBJECTIVE:    VS:   Vitals:    07/15/24 1443   BP: (!) 111/57   Site: Left Upper Arm   Position: Sitting   Cuff Size: Medium Adult   Pulse: (!) 101   Resp: 16   Temp: 98 °F (36.7 °C)   TempSrc: Temporal   SpO2: 95%   Weight: 89.8 kg (197 lb 14.4 oz)   Height: 1.6 m (5' 3\")     Physical Exam  Constitutional:       General: She is not in acute distress.     Appearance: Normal appearance.   HENT:      Head: Normocephalic and atraumatic.   Cardiovascular:      Rate and Rhythm: Normal rate and regular rhythm.      Heart

## 2024-07-19 RX ORDER — ACETAMINOPHEN 325 MG/1
325 TABLET ORAL EVERY 6 HOURS PRN
Qty: 120 TABLET | Refills: 2 | Status: SHIPPED | OUTPATIENT
Start: 2024-07-19

## 2024-07-25 DIAGNOSIS — Q20.8 ABNORMALITY OF LEFT VENTRICULAR OUTFLOW TRACT: Primary | ICD-10-CM

## 2024-07-25 DIAGNOSIS — I42.2 HYPERTROPHIC CARDIOMYOPATHY (HCC): ICD-10-CM

## 2024-07-26 PROCEDURE — 93298 REM INTERROG DEV EVAL SCRMS: CPT | Performed by: INTERNAL MEDICINE

## 2024-08-12 ENCOUNTER — TELEPHONE (OUTPATIENT)
Dept: INTERNAL MEDICINE | Age: 64
End: 2024-08-12

## 2024-08-12 NOTE — TELEPHONE ENCOUNTER
Review of chart for follow-up. Patient is now scheduled for the Cardiac MRI as appropriate. Please note she is to have a follow-up appt with Cardiology. Please request scheduling now that the MRI has been appropriately scheduled.

## 2024-08-14 NOTE — TELEPHONE ENCOUNTER
Called pt and informed needs to schedule appt with Cardiology after she has MRI Cardiac done which is schedule Oct 1,2024 Phone number for Cardiology was provided to pt.

## 2024-08-19 DIAGNOSIS — E53.8 B12 DEFICIENCY: ICD-10-CM

## 2024-08-19 RX ORDER — LANOLIN ALCOHOL/MO/W.PET/CERES
1000 CREAM (GRAM) TOPICAL DAILY
Qty: 30 TABLET | Refills: 2 | Status: SHIPPED | OUTPATIENT
Start: 2024-08-19

## 2024-09-16 RX ORDER — ACETAMINOPHEN 325 MG/1
325 TABLET ORAL EVERY 6 HOURS PRN
Qty: 120 TABLET | Refills: 2 | Status: SHIPPED | OUTPATIENT
Start: 2024-09-16

## 2024-09-25 ENCOUNTER — TELEPHONE (OUTPATIENT)
Dept: ADMINISTRATIVE | Age: 64
End: 2024-09-25

## 2024-10-02 ENCOUNTER — HOSPITAL ENCOUNTER (OUTPATIENT)
Age: 64
Discharge: HOME | End: 2024-10-02
Payer: MEDICAID

## 2024-10-02 ENCOUNTER — HOSPITAL ENCOUNTER (OUTPATIENT)
Dept: MRI IMAGING | Age: 64
Discharge: HOME | End: 2024-10-04
Payer: MEDICAID

## 2024-10-02 DIAGNOSIS — E87.6 HYPOKALEMIA: ICD-10-CM

## 2024-10-02 DIAGNOSIS — I42.2 HYPERTROPHIC CARDIOMYOPATHY (HCC): ICD-10-CM

## 2024-10-02 DIAGNOSIS — Q20.8 ABNORMALITY OF LEFT VENTRICULAR OUTFLOW TRACT: ICD-10-CM

## 2024-10-02 DIAGNOSIS — E83.42 HYPOMAGNESEMIA: ICD-10-CM

## 2024-10-02 LAB
ANION GAP SERPL CALCULATED.3IONS-SCNC: 15 MMOL/L (ref 7–16)
BUN SERPL-MCNC: 20 MG/DL (ref 6–23)
CALCIUM SERPL-MCNC: 9.8 MG/DL (ref 8.6–10.2)
CHLORIDE SERPL-SCNC: 107 MMOL/L (ref 98–107)
CO2 SERPL-SCNC: 21 MMOL/L (ref 22–29)
CREAT SERPL-MCNC: 1.2 MG/DL (ref 0.5–1)
GFR, ESTIMATED: 51 ML/MIN/1.73M2
GLUCOSE SERPL-MCNC: 163 MG/DL (ref 74–99)
POTASSIUM SERPL-SCNC: 3 MMOL/L (ref 3.5–5)
SODIUM SERPL-SCNC: 143 MMOL/L (ref 132–146)

## 2024-10-02 PROCEDURE — 80048 BASIC METABOLIC PNL TOTAL CA: CPT

## 2024-10-02 PROCEDURE — A9579 GAD-BASE MR CONTRAST NOS,1ML: HCPCS | Performed by: RADIOLOGY

## 2024-10-02 PROCEDURE — 75561 CARDIAC MRI FOR MORPH W/DYE: CPT

## 2024-10-02 PROCEDURE — 36415 COLL VENOUS BLD VENIPUNCTURE: CPT

## 2024-10-02 PROCEDURE — 6360000004 HC RX CONTRAST MEDICATION: Performed by: RADIOLOGY

## 2024-10-02 RX ADMIN — GADOTERIDOL 36 ML: 279.3 INJECTION, SOLUTION INTRAVENOUS at 13:47

## 2024-10-08 NOTE — PROGRESS NOTES
[facility]- [facility] [place] [place] [facility]  Outpatient consultation  PATIENT: Edith Howard  MEDICAL RECORD NUMBER: [id number]  DATE OF SERVICE:  10/9/2024  ATTENDING ELECTROPHYSIOLOGIST: Kayla Diez MD  REFERRING PHYSICIAN: No ref. provider found and Ivonne Huitron MD  Chief Complaint   Patient presents with    Follow-up         HPI: This is a 64 y.o. female with a history of hypertension, diabetes, LVOT gradient on echocardiography who presents to cardiac electrophysiology clinic for consultation since her recent remote check of her implantable loop recorder revealed a 5-second pause.  She was hospitalized for frequent episodes of falling and fatigue last year when a loop recorder insertion was requested and completed by me.  We have been monitoring her remote checks and recent remote check revealed the 5-second pause in the evening hours.  Her symptoms of \"falls\" as well as  fatigue have improved with current medical therapy  She has been seen by cardiology recently and a cardiac MRI was requested.  Results reviewed.  Patient is not very active and denies any new cardiac symptoms including those of syncope or near syncope.  No dyspnea at rest or on exertion.  No chest discomfort.    Patient Active Problem List    Diagnosis Date Noted    B12 deficiency 11/16/2023    Cerebral multi-infarct state 11/16/2023    Difficulty in walking, not elsewhere classified 10/10/2023    Other symbolic dysfunctions 10/10/2023    Cerebral ischemia 10/09/2023    Muscle weakness (generalized) 10/09/2023    Other hypertrophic cardiomyopathy (HCC) 10/09/2023    Presence of other cardiac implants and grafts 10/09/2023    Type 2 diabetes mellitus with hyperglycemia (HCC) 10/09/2023    Acute stroke due to ischemia (HCC) 10/04/2023    Essential (primary) hypertension 10/04/2023    Uncontrolled type 2 diabetes mellitus with hyperglycemia (HCC) 10/04/2023    Mixed hyperlipidemia 10/04/2023    Cognitive

## 2024-10-09 ENCOUNTER — NURSE ONLY (OUTPATIENT)
Dept: NON INVASIVE DIAGNOSTICS | Age: 64
End: 2024-10-09

## 2024-10-09 ENCOUNTER — OFFICE VISIT (OUTPATIENT)
Dept: NON INVASIVE DIAGNOSTICS | Age: 64
End: 2024-10-09

## 2024-10-09 VITALS
DIASTOLIC BLOOD PRESSURE: 102 MMHG | RESPIRATION RATE: 18 BRPM | SYSTOLIC BLOOD PRESSURE: 146 MMHG | BODY MASS INDEX: 34.73 KG/M2 | WEIGHT: 196 LBS | HEART RATE: 80 BPM | HEIGHT: 63 IN

## 2024-10-09 DIAGNOSIS — R55 SYNCOPE AND COLLAPSE: ICD-10-CM

## 2024-10-09 DIAGNOSIS — Z98.890 HISTORY OF LOOP RECORDER: Primary | ICD-10-CM

## 2024-10-09 DIAGNOSIS — I10 ESSENTIAL (PRIMARY) HYPERTENSION: ICD-10-CM

## 2024-10-09 DIAGNOSIS — G47.30 SLEEP-DISORDERED BREATHING: Primary | ICD-10-CM

## 2024-10-10 DIAGNOSIS — G47.33 OSA (OBSTRUCTIVE SLEEP APNEA): Primary | ICD-10-CM

## 2024-10-15 ENCOUNTER — TELEPHONE (OUTPATIENT)
Dept: CARDIOLOGY CLINIC | Age: 64
End: 2024-10-15

## 2024-10-15 NOTE — TELEPHONE ENCOUNTER
Waleska Trejo MD McGee, Shelia, MA  Tell her that her Cardiac MRI showed mild thickening of her heart muscle; heart function normal.  Continue current medications  Routine f/u OV as scheduled      Called patient gave results.

## 2024-10-16 ENCOUNTER — TELEPHONE (OUTPATIENT)
Dept: SLEEP CENTER | Age: 64
End: 2024-10-16

## 2024-10-16 DIAGNOSIS — E53.8 B12 DEFICIENCY: ICD-10-CM

## 2024-10-16 NOTE — TELEPHONE ENCOUNTER
Spoke with patient to attempt to schedule sleep study. Pt unable to commit to appt until speaking with son first. States will call us back.

## 2024-10-17 RX ORDER — LANOLIN ALCOHOL/MO/W.PET/CERES
1000 CREAM (GRAM) TOPICAL DAILY
Qty: 30 TABLET | Refills: 2 | OUTPATIENT
Start: 2024-10-17

## 2024-10-31 ENCOUNTER — OFFICE VISIT (OUTPATIENT)
Dept: CARDIOLOGY CLINIC | Age: 64
End: 2024-10-31
Payer: MEDICAID

## 2024-10-31 VITALS — SYSTOLIC BLOOD PRESSURE: 134 MMHG | HEART RATE: 92 BPM | DIASTOLIC BLOOD PRESSURE: 72 MMHG

## 2024-10-31 DIAGNOSIS — I51.7 LVH (LEFT VENTRICULAR HYPERTROPHY): Primary | ICD-10-CM

## 2024-10-31 DIAGNOSIS — Z86.73 HISTORY OF CVA (CEREBROVASCULAR ACCIDENT): ICD-10-CM

## 2024-10-31 DIAGNOSIS — I10 PRIMARY HYPERTENSION: ICD-10-CM

## 2024-10-31 DIAGNOSIS — E11.65 TYPE 2 DIABETES MELLITUS WITH HYPERGLYCEMIA, WITHOUT LONG-TERM CURRENT USE OF INSULIN (HCC): ICD-10-CM

## 2024-10-31 PROBLEM — I42.2 OTHER HYPERTROPHIC CARDIOMYOPATHY (HCC): Status: RESOLVED | Noted: 2023-10-09 | Resolved: 2024-10-31

## 2024-10-31 PROCEDURE — 93000 ELECTROCARDIOGRAM COMPLETE: CPT | Performed by: INTERNAL MEDICINE

## 2024-10-31 PROCEDURE — 3044F HG A1C LEVEL LT 7.0%: CPT | Performed by: INTERNAL MEDICINE

## 2024-10-31 PROCEDURE — 3075F SYST BP GE 130 - 139MM HG: CPT | Performed by: INTERNAL MEDICINE

## 2024-10-31 PROCEDURE — 3078F DIAST BP <80 MM HG: CPT | Performed by: INTERNAL MEDICINE

## 2024-10-31 PROCEDURE — 99214 OFFICE O/P EST MOD 30 MIN: CPT | Performed by: INTERNAL MEDICINE

## 2024-10-31 RX ORDER — AMLODIPINE BESYLATE 10 MG/1
10 TABLET ORAL DAILY
Qty: 30 TABLET | Refills: 5 | Status: SHIPPED | OUTPATIENT
Start: 2024-10-31

## 2024-10-31 NOTE — PROGRESS NOTES
OFFICE VISIT     PRIMARY CARE PHYSICIAN:      Ivonne Huitron MD       ALLERGIES / SENSITIVITIES:      No Known Allergies       REVIEWED MEDICATIONS:        Current Outpatient Medications:     amLODIPine (NORVASC) 10 MG tablet, Take 1 tablet by mouth daily, Disp: 30 tablet, Rfl: 5    acetaminophen (TYLENOL) 325 MG tablet, TAKE 1 TABLET BY MOUTH EVERY 6 HOURS AS NEEDED FOR PAIN, Disp: 120 tablet, Rfl: 2    vitamin B-12 (CYANOCOBALAMIN) 1000 MCG tablet, TAKE 1 TABLET BY MOUTH DAILY, Disp: 30 tablet, Rfl: 2    aspirin (ASPIRIN LOW DOSE) 81 MG EC tablet, Take 1 tablet by mouth daily, Disp: 90 tablet, Rfl: 1    losartan (COZAAR) 25 MG tablet, Take 1 tablet by mouth daily, Disp: 90 tablet, Rfl: 1    metFORMIN (GLUCOPHAGE) 500 MG tablet, Take 1 tablet by mouth 2 times daily, Disp: 180 tablet, Rfl: 1    rosuvastatin (CRESTOR) 40 MG tablet, Take 1 tablet by mouth nightly, Disp: 90 tablet, Rfl: 1      S: REASON FOR VISIT:       Chief Complaint   Patient presents with    Hypertension     Annual..pt has no c/c          History of Present Illness:       Office Visit for follow up of LVH, HTN, CVA    I saw during 10/2023 admission-Echo showed LVOT gradients-MRI oedered   Seen by our KEESHA - Shannen, in April 2024-MRI done for LVOT gradients   No hospitalizations or surgeries recently   Compliant with all medications   Catarino any exertional chest pain or short of breath   No palpitations, dizzy or syncope.   Uses walker   Try to watch diet          Past Medical History:   Diagnosis Date    Ambulatory dysfunction     B12 deficiency     Diabetes mellitus (HCC)     HTN (hypertension)     Lacunar stroke (HCC)     Macrocytic anemia             Past Surgical History:   Procedure Laterality Date    COLONOSCOPY N/A 03/11/2024    COLORECTAL CANCER SCREENING, NOT HIGH RISK performed by [place] Schumacher [place] [facility] [place]    DILATION AND CURETTAGE OF UTERUS      INSERTABLE CARDIAC MONITOR  10/05/2023    Dr. Diez        No family history on

## 2024-11-14 DIAGNOSIS — E53.8 B12 DEFICIENCY: ICD-10-CM

## 2024-11-14 NOTE — TELEPHONE ENCOUNTER
Name of Medication(s) Requested:  Requested Prescriptions     Pending Prescriptions Disp Refills    vitamin B-12 (CYANOCOBALAMIN) 1000 MCG tablet [Pharmacy Med Name: VITAMIN B-12 1000MCG TABLET] 90 tablet 1     Sig: TAKE 1 TABLET BY MOUTH DAILY       Medication is on current medication list Yes    Dosage and directions were verified? Yes    Quantity verified: 90 day supply     Pharmacy Verified?  Yes    Last Appointment:  Visit date not found    Future appts:  Future Appointments   Date Time Provider Department Center   12/23/2024 10:00 AM Ivonne Huitron MD [facility] DEP   1/29/2025 11:00 AM Amrik Prince MD [facility] SLEEP [facility]   5/5/2025 10:30 AM Waleska Trejo MD [facility] CARDIO [facility]        (If no appt send self scheduling link. .REFILLAPPT)  Scheduling request sent?     [] Yes  [x] No    Does patient need updated?  [] Yes  [x] No

## 2024-11-15 RX ORDER — LANOLIN ALCOHOL/MO/W.PET/CERES
1000 CREAM (GRAM) TOPICAL DAILY
Qty: 90 TABLET | Refills: 1 | Status: SHIPPED | OUTPATIENT
Start: 2024-11-15

## 2024-11-26 DIAGNOSIS — G47.33 OBSTRUCTIVE SLEEP APNEA: Primary | ICD-10-CM

## 2024-12-11 PROCEDURE — 93298 REM INTERROG DEV EVAL SCRMS: CPT | Performed by: INTERNAL MEDICINE

## 2024-12-23 ENCOUNTER — HOSPITAL ENCOUNTER (OUTPATIENT)
Dept: SLEEP CENTER | Age: 64
Discharge: HOME | End: 2024-12-23
Payer: MEDICAID

## 2024-12-23 ENCOUNTER — OFFICE VISIT (OUTPATIENT)
Dept: INTERNAL MEDICINE | Age: 64
End: 2024-12-23
Payer: MEDICAID

## 2024-12-23 VITALS
BODY MASS INDEX: 33.77 KG/M2 | HEART RATE: 83 BPM | TEMPERATURE: 97.1 F | SYSTOLIC BLOOD PRESSURE: 113 MMHG | WEIGHT: 190.6 LBS | DIASTOLIC BLOOD PRESSURE: 66 MMHG | OXYGEN SATURATION: 97 % | HEIGHT: 63 IN | RESPIRATION RATE: 18 BRPM

## 2024-12-23 DIAGNOSIS — I69.30 CEREBRAL MULTI-INFARCT STATE: ICD-10-CM

## 2024-12-23 DIAGNOSIS — E78.49 OTHER HYPERLIPIDEMIA: Primary | ICD-10-CM

## 2024-12-23 DIAGNOSIS — M19.90 CHRONIC OSTEOARTHRITIS: ICD-10-CM

## 2024-12-23 DIAGNOSIS — G47.33 OBSTRUCTIVE SLEEP APNEA: ICD-10-CM

## 2024-12-23 DIAGNOSIS — E53.8 B12 DEFICIENCY: ICD-10-CM

## 2024-12-23 DIAGNOSIS — I10 ESSENTIAL (PRIMARY) HYPERTENSION: ICD-10-CM

## 2024-12-23 DIAGNOSIS — E11.65 TYPE 2 DIABETES MELLITUS WITH HYPERGLYCEMIA, WITHOUT LONG-TERM CURRENT USE OF INSULIN (HCC): ICD-10-CM

## 2024-12-23 PROCEDURE — 3078F DIAST BP <80 MM HG: CPT

## 2024-12-23 PROCEDURE — 95810 POLYSOM 6/> YRS 4/> PARAM: CPT

## 2024-12-23 PROCEDURE — 99214 OFFICE O/P EST MOD 30 MIN: CPT

## 2024-12-23 PROCEDURE — 99212 OFFICE O/P EST SF 10 MIN: CPT

## 2024-12-23 PROCEDURE — 83036 HEMOGLOBIN GLYCOSYLATED A1C: CPT

## 2024-12-23 PROCEDURE — 3074F SYST BP LT 130 MM HG: CPT

## 2024-12-23 RX ORDER — ASPIRIN 81 MG/1
81 TABLET ORAL DAILY
Qty: 90 TABLET | Refills: 1 | Status: SHIPPED | OUTPATIENT
Start: 2024-12-23

## 2024-12-23 RX ORDER — LANOLIN ALCOHOL/MO/W.PET/CERES
1000 CREAM (GRAM) TOPICAL DAILY
Qty: 90 TABLET | Refills: 1 | Status: SHIPPED | OUTPATIENT
Start: 2024-12-23

## 2024-12-23 RX ORDER — LOSARTAN POTASSIUM 25 MG/1
25 TABLET ORAL DAILY
Qty: 90 TABLET | Refills: 1 | Status: SHIPPED | OUTPATIENT
Start: 2024-12-23

## 2024-12-23 RX ORDER — ACETAMINOPHEN 325 MG/1
325 TABLET ORAL EVERY 6 HOURS PRN
Qty: 120 TABLET | Refills: 2 | Status: SHIPPED | OUTPATIENT
Start: 2024-12-23

## 2024-12-23 RX ORDER — AMLODIPINE BESYLATE 10 MG/1
10 TABLET ORAL DAILY
Qty: 30 TABLET | Refills: 5 | Status: SHIPPED | OUTPATIENT
Start: 2024-12-23

## 2024-12-23 RX ORDER — ROSUVASTATIN CALCIUM 40 MG/1
40 TABLET, COATED ORAL NIGHTLY
Qty: 90 TABLET | Refills: 1 | Status: SHIPPED | OUTPATIENT
Start: 2024-12-23

## 2024-12-23 NOTE — PROGRESS NOTES
[facility] - [facility] Internal [facility]      SUBJECTIVE:  Edith Howard (:  1960) is a 64 y.o. female here for evaluation of the following chief complaint(s):  Check-Up      Ms Edith Howard is a 64 year old female with PMH of CVA, HFpEF, hypertension, type 2 diabetes mellitus, vitamin B 12 deficiency presented to the [facility] for routine follow up. She has a history of CVA in  and is currently s/p loop recorder implant in 10/2023 with interrogation showing bried A Fib episodes of about 2 minutes with rates 115-125  (, , , and May 4 of 2024). Cardiac MRI showed mild asymmetric septal hypertrophy. She also had a brief 5 minute sinus pause noted at night on 24. Follows with Dr Trejo and Dr Diez. She has a sleep study in 2025 to role out sleep apnea. Hba1C was elevated from 6.5 in  to 7.3 today. Metformin 500 mg BID was increased to 1000 mg BD. Diabetic foot exam today was normal.       Hypertension   BP in clinic is 113/66  Home regimen: Amlodipine 10 mg daily, losartan 25 mg daily  She does not measure BP at home  last BMP(2023)- Cr 1, BUN  21, Na 142, K 3.4  Recent BMP 10/24 - Cr 1.2, BUN 20, Na 143, K 3.0  BP remains within goal  Continue current regimen  Follow Microalbumin/Cr ratio       Type 2 DM  Hba1C today 7.3 from 6.5 in 2024  She does not check blood glucose at home as she does not like to prick her finger daily  Diabetic foot exam today was normal  Denies symptoms of hypoglycemia   She follows with [facility] for eye exam  On Metformin 500 mg BID  Metformin increased to 1000 mg BID daily       History of CVA (cerebrovascular accident) - 10/2023  Mild dysarthria / Mild to moderate expressive aphasia/word finding difficulty struggles with repetition   per hospital notes 10/23 10/4/2023 MRI Brain -Acute to subacute infarcts involving the right cerebral peduncle as well as the paramedian frontal lobes bilaterally.  Minimal global

## 2024-12-23 NOTE — PATIENT INSTRUCTIONS
Dear Edith Howard,    Thank you for coming to your appointment today. I hope we have addressed all of your needs.     Please make sure to do the following:  - Continue your medications as listed.  - Get labs drawn before our next follow up.  - We will see each other again in     Call for a sooner appointment if you develop any new or worsening symptoms.    Have a great day!    Sincerely,  Ivonne Huitron MD  12/23/2024  11:28 AM

## 2024-12-23 NOTE — PROGRESS NOTES
[facility]  Internal Medicine Residency Clinic    Attending Physician Statement  I have discussed the case, including pertinent history and exam findings with the resident physician.I have seen and examined the patient and the key elements of the encounter have been performed by me. I agree with the assessment, plan and orders as documented by the resident. I have reviewed the relevant PMHx, PSHx, FamHx, SocialHx, medications, and allergies and updated history as appropriate.    Patient presents for routine follow up of medical problems.    No acute complaints    Follows with Cardiology- Dr. Trejo and EP- Dr. Diez    Loop recorder placed for multi-focal stroke concerns   Cardiac MRI was previously completed     Stroke Hx    Multi-infarct areas   Implantable loop recorder being followed and continued    Continue 2nd risk reduction management   Recommend stroke team follow-up     Type II DM   Ongoing management   Adjusted Metformin   Continue to follow     Hypertension- at goal per ACC/AHA   Continue current management     Assymetric Hypertrophy on Cardiac MRI of LV    LVOT concerns   Following with EP and Cards   Loop recorder remains in place    Sinus Pauses    Loop recorder remains in place   Recommending Sleep apnea testing   Sleep test is currently scheduled   Follow-up with EP         Remainder of medical problems as per resident note.    Stevo Thakkar MD, FACP   12/23/2024 11:43 AM

## 2025-01-29 ENCOUNTER — OFFICE VISIT (OUTPATIENT)
Dept: SLEEP CENTER | Age: 65
End: 2025-01-29
Payer: MEDICAID

## 2025-01-29 VITALS
SYSTOLIC BLOOD PRESSURE: 121 MMHG | RESPIRATION RATE: 16 BRPM | TEMPERATURE: 97.9 F | WEIGHT: 201.06 LBS | BODY MASS INDEX: 35.62 KG/M2 | DIASTOLIC BLOOD PRESSURE: 83 MMHG | OXYGEN SATURATION: 97 % | HEIGHT: 63 IN | HEART RATE: 98 BPM

## 2025-01-29 DIAGNOSIS — G47.33 OSA (OBSTRUCTIVE SLEEP APNEA): Primary | ICD-10-CM

## 2025-01-29 DIAGNOSIS — E66.9 OBESITY (BMI 30-39.9): ICD-10-CM

## 2025-01-29 PROCEDURE — 3074F SYST BP LT 130 MM HG: CPT | Performed by: STUDENT IN AN ORGANIZED HEALTH CARE EDUCATION/TRAINING PROGRAM

## 2025-01-29 PROCEDURE — 3079F DIAST BP 80-89 MM HG: CPT | Performed by: STUDENT IN AN ORGANIZED HEALTH CARE EDUCATION/TRAINING PROGRAM

## 2025-01-29 PROCEDURE — 99204 OFFICE O/P NEW MOD 45 MIN: CPT | Performed by: STUDENT IN AN ORGANIZED HEALTH CARE EDUCATION/TRAINING PROGRAM

## 2025-01-29 ASSESSMENT — SLEEP AND FATIGUE QUESTIONNAIRES
HOW LIKELY ARE YOU TO NOD OFF OR FALL ASLEEP WHEN YOU ARE A PASSENGER IN A CAR FOR AN HOUR WITHOUT A BREAK: WOULD NEVER DOZE
HOW LIKELY ARE YOU TO NOD OFF OR FALL ASLEEP WHILE SITTING INACTIVE IN A PUBLIC PLACE: WOULD NEVER DOZE
ESS TOTAL SCORE: 3
HOW LIKELY ARE YOU TO NOD OFF OR FALL ASLEEP WHILE SITTING AND TALKING TO SOMEONE: WOULD NEVER DOZE
HOW LIKELY ARE YOU TO NOD OFF OR FALL ASLEEP WHILE LYING DOWN TO REST IN THE AFTERNOON WHEN CIRCUMSTANCES PERMIT: HIGH CHANCE OF DOZING
HOW LIKELY ARE YOU TO NOD OFF OR FALL ASLEEP IN A CAR, WHILE STOPPED FOR A FEW MINUTES IN TRAFFIC: WOULD NEVER DOZE
HOW LIKELY ARE YOU TO NOD OFF OR FALL ASLEEP WHILE SITTING QUIETLY AFTER LUNCH WITHOUT ALCOHOL: WOULD NEVER DOZE
HOW LIKELY ARE YOU TO NOD OFF OR FALL ASLEEP WHILE SITTING AND READING: WOULD NEVER DOZE
HOW LIKELY ARE YOU TO NOD OFF OR FALL ASLEEP WHILE WATCHING TV: WOULD NEVER DOZE

## 2025-01-29 NOTE — PROGRESS NOTES
[facility] Sleep Medicine    Patient Name: Edith Howard  Age: 64 y.o.   : 1960  Date of Visit: 25    Assessment and Plan:     1. PB (obstructive sleep apnea)  Low REM sleep in diagnostic testing   Repeat sleep study as there is a high likelihood the original test was a false negative    2. Obesity (BMI 30-39.9)  Rec'd 10-20% weight loss of total body weight (if feasible). Patient instructed on proper nutrition and estimated total daily caloric expenditure for their height and weight. Discussed that PB may improve with weight loss, but there is no guarantee of reversal.         No follow-ups on file.    History:    HPI   Edith Howard is a 64 y.o. female with  has a past medical history of Ambulatory dysfunction, B12 deficiency, Diabetes mellitus (HCC), HTN (hypertension), Lacunar stroke (HCC), and Macrocytic anemia. who presents as a new patient to Sleep Clinic, referred by Dr. Diez, for Sleep Apnea    - Here for PSG results  - Results inconsistent with PB from a 4% criteria  - However, she had elevated REM sleep AHI (20 events per hour)  - History of CVA, snoring, fatigue    PMH:  Past Medical History:   Diagnosis Date    Ambulatory dysfunction     B12 deficiency     Diabetes mellitus (HCC)     HTN (hypertension)     Lacunar stroke (HCC)     Macrocytic anemia         PSH:  Past Surgical History:   Procedure Laterality Date    COLONOSCOPY N/A 2024    COLORECTAL CANCER SCREENING, NOT HIGH RISK performed by John Schumacher MD at [facility] [place] [place]    DILATION AND CURETTAGE OF UTERUS      INSERTABLE CARDIAC MONITOR  10/05/2023    Dr. Diez        Soc Hx:  Social History     Tobacco Use    Smoking status: Never    Smokeless tobacco: Never   Vaping Use    Vaping status: Never Used   Substance Use Topics    Alcohol use: Never    Drug use: Never        Fam Hx:  No family history on file.     Current Outpatient Medications   Medication Sig Dispense Refill    aspirin (ASPIRIN LOW DOSE) 81 MG EC tablet

## 2025-02-11 PROCEDURE — 93298 REM INTERROG DEV EVAL SCRMS: CPT | Performed by: INTERNAL MEDICINE

## 2025-02-12 ENCOUNTER — OFFICE VISIT (OUTPATIENT)
Dept: INTERNAL MEDICINE | Age: 65
End: 2025-02-12
Payer: MEDICAID

## 2025-02-12 VITALS
OXYGEN SATURATION: 95 % | SYSTOLIC BLOOD PRESSURE: 127 MMHG | DIASTOLIC BLOOD PRESSURE: 81 MMHG | WEIGHT: 198 LBS | RESPIRATION RATE: 14 BRPM | HEART RATE: 99 BPM | TEMPERATURE: 97.5 F | BODY MASS INDEX: 35.08 KG/M2 | HEIGHT: 63 IN

## 2025-02-12 DIAGNOSIS — E87.6 HYPOKALEMIA: ICD-10-CM

## 2025-02-12 DIAGNOSIS — E78.49 OTHER HYPERLIPIDEMIA: ICD-10-CM

## 2025-02-12 DIAGNOSIS — E11.65 TYPE 2 DIABETES MELLITUS WITH HYPERGLYCEMIA, WITHOUT LONG-TERM CURRENT USE OF INSULIN (HCC): Primary | ICD-10-CM

## 2025-02-12 LAB
ANION GAP SERPL CALCULATED.3IONS-SCNC: 12 MMOL/L (ref 7–16)
BUN BLDV-MCNC: 22 MG/DL (ref 6–23)
CALCIUM SERPL-MCNC: 10 MG/DL (ref 8.6–10.2)
CHLORIDE BLD-SCNC: 107 MMOL/L (ref 98–107)
CHOLESTEROL, TOTAL: 88 MG/DL
CO2: 22 MMOL/L (ref 22–29)
CREAT SERPL-MCNC: 1.2 MG/DL (ref 0.5–1)
GFR, ESTIMATED: 50 ML/MIN/1.73M2
GLUCOSE BLD-MCNC: 173 MG/DL (ref 74–99)
HDLC SERPL-MCNC: 37 MG/DL
LDL CHOLESTEROL: 23 MG/DL
POTASSIUM SERPL-SCNC: 3.7 MMOL/L (ref 3.5–5)
SODIUM BLD-SCNC: 141 MMOL/L (ref 132–146)
TRIGL SERPL-MCNC: 138 MG/DL
VLDLC SERPL CALC-MCNC: 28 MG/DL

## 2025-02-12 PROCEDURE — 36415 COLL VENOUS BLD VENIPUNCTURE: CPT | Performed by: INTERNAL MEDICINE

## 2025-02-12 PROCEDURE — 99212 OFFICE O/P EST SF 10 MIN: CPT

## 2025-02-12 SDOH — ECONOMIC STABILITY: FOOD INSECURITY: WITHIN THE PAST 12 MONTHS, THE FOOD YOU BOUGHT JUST DIDN'T LAST AND YOU DIDN'T HAVE MONEY TO GET MORE.: NEVER TRUE

## 2025-02-12 SDOH — ECONOMIC STABILITY: FOOD INSECURITY: WITHIN THE PAST 12 MONTHS, YOU WORRIED THAT YOUR FOOD WOULD RUN OUT BEFORE YOU GOT MONEY TO BUY MORE.: NEVER TRUE

## 2025-02-12 ASSESSMENT — ENCOUNTER SYMPTOMS
COLOR CHANGE: 0
CHOKING: 0
EYE ITCHING: 0
EYE DISCHARGE: 0
BLOOD IN STOOL: 0
APNEA: 0
CHEST TIGHTNESS: 0
FACIAL SWELLING: 0
ABDOMINAL PAIN: 0
SHORTNESS OF BREATH: 0
ABDOMINAL DISTENTION: 0
BACK PAIN: 0
EYE PAIN: 0
COUGH: 0
EYE REDNESS: 0
ANAL BLEEDING: 0
PHOTOPHOBIA: 0

## 2025-02-12 ASSESSMENT — PATIENT HEALTH QUESTIONNAIRE - PHQ9
SUM OF ALL RESPONSES TO PHQ QUESTIONS 1-9: 0
1. LITTLE INTEREST OR PLEASURE IN DOING THINGS: NOT AT ALL
SUM OF ALL RESPONSES TO PHQ QUESTIONS 1-9: 0
2. FEELING DOWN, DEPRESSED OR HOPELESS: NOT AT ALL
SUM OF ALL RESPONSES TO PHQ QUESTIONS 1-9: 0
SUM OF ALL RESPONSES TO PHQ QUESTIONS 1-9: 0
SUM OF ALL RESPONSES TO PHQ9 QUESTIONS 1 & 2: 0

## 2025-02-12 NOTE — PATIENT INSTRUCTIONS
Dear Edith Howard,    Thank you for coming to your appointment today. I hope we have addressed all of your needs.     Please make sure to do the following:  - Continue your medications as listed.  - Get labs drawn before our next follow up.  - We will see each other again in 4 months    Call for a sooner appointment if you develop any new or worsening symptoms.    Have a great day!    Sincerely,  Ivonne Huitron MD  2/12/2025  11:35 AM

## 2025-02-12 NOTE — PROGRESS NOTES
[facility]  Internal Medicine Residency Clinic  Attending Physician Statement:  Romero Almanza M.D., F.A.C.P.  Patient is seen for fu visit today.  -- acute and chronic problems addressed  Addressed when applicable- Health maintenance issues of vaccinations, social determinants/depression/CA screening, tobacco cessation etc...  I have discussed the case, including pertinent history and exam findings with the resident, review of last visit medical records/labs-   I agree with the assessment, plan and orders as documented by the resident.    -Billiing assessed by medical complexity of case  My assessment of high points of todays visit as follows:       PAF+ follows [facility]  On ASA and statin  Hx of CVA back in 2023  Chronic lacunar infarcts involving both thalami    10/2023 admission-Echo showed LVOT gradients-  Agitated saline contrast study likely negative for ASD/ PFO.   Elevated LVOT gradients with possible chordal DANISH. Peak gradient is 59 mm  Hg.    10.6.24- MRI LVOT gradients   mild asymmetrical septal hypertrophy (IVS 16 mm).    LV outflow track was not well visualized   if hypertrophic/ infiltrative   cardiomyopathy is being considered-- No evidence of intramyocardial fibrosis, inflammatory pattern, infiltrative  disease or prior infarct.  Doubt LVOT       NOT on anticoagulation?? Per EP or neuro  On ASA  PB (obstructive sleep apnea)--nconsistent with PB from a 4% criteria  - However, she had elevated REM sleep AHI (20 events per hour)  Low REM sleep in diagnostic testing   Repeat sleep study as there is a high likelihood the original test was a false negative      Dm2 - metformin 500bid (was Rx 1000bid-- not received by pharmacy)- aic >goal  Will inc metformin  No hypoglycemia  /81 (Site: Left Upper Arm, Position: Sitting, Cuff Size: Large Adult)   Pulse 99   Temp 97.5 °F (36.4 °C) (Temporal)   Resp 14   Ht 1.6 m (5' 3\")   Wt 89.8 kg (198 lb)   SpO2 95%   BMI 35.07 
Pt received a venipuncture blood draw left ac area. Tolerated well with no adverse reactions. Sent one green tube to lab.   
    RTC:  Return in about 4 months (around 6/2/2025).      I have reviewed my findings and recommendations with Edith Howard and Dr. Almanza.    Ivonne Huitron MD   2/12/2025 4:04 PM

## 2025-03-19 PROCEDURE — 93298 REM INTERROG DEV EVAL SCRMS: CPT | Performed by: INTERNAL MEDICINE

## 2025-04-07 DIAGNOSIS — M19.90 CHRONIC OSTEOARTHRITIS: ICD-10-CM

## 2025-04-07 NOTE — TELEPHONE ENCOUNTER
Name of Medication(s) Requested:  Requested Prescriptions     Pending Prescriptions Disp Refills    acetaminophen (TYLENOL) 325 MG tablet [Pharmacy Med Name: ACETAMINOPHEN 325MG TABLET] 120 tablet 2     Sig: TAKE 1 TABLET BY MOUTH EVERY 6 HOURS AS NEEDED FOR PAIN       Medication is on current medication list Yes    Dosage and directions were verified? Yes    Quantity verified: 90 day supply     Pharmacy Verified?  Yes    Last Appointment:  2/12/2025    Future appts:  Future Appointments   Date Time Provider Department Center   5/5/2025 10:30 AM Wales[facility] Trejo MD [facility] CARD[facility]   6/3/2025 11:40 AM Kayla Diez MD [facility] ELECTR [facility]   6/5/2025 10:00 AM Ivonne Huitron MD [facility] ECC DEP        (If no appt send self scheduling link. .REFILLAPPT)  Scheduling request sent?     [] Yes  [x] No    Does patient need updated?  [] Yes  [x] No

## 2025-04-10 RX ORDER — ACETAMINOPHEN 325 MG/1
325 TABLET ORAL EVERY 6 HOURS PRN
Qty: 120 TABLET | Refills: 2 | Status: SHIPPED | OUTPATIENT
Start: 2025-04-10

## 2025-04-19 PROCEDURE — 93298 REM INTERROG DEV EVAL SCRMS: CPT | Performed by: INTERNAL MEDICINE

## 2025-05-05 ENCOUNTER — OFFICE VISIT (OUTPATIENT)
Dept: CARDIOLOGY CLINIC | Age: 65
End: 2025-05-05

## 2025-05-05 VITALS
DIASTOLIC BLOOD PRESSURE: 80 MMHG | WEIGHT: 199 LBS | BODY MASS INDEX: 35.26 KG/M2 | SYSTOLIC BLOOD PRESSURE: 108 MMHG | RESPIRATION RATE: 20 BRPM | HEART RATE: 96 BPM | HEIGHT: 63 IN

## 2025-05-05 DIAGNOSIS — Z86.73 HISTORY OF CVA (CEREBROVASCULAR ACCIDENT): ICD-10-CM

## 2025-05-05 DIAGNOSIS — I10 PRIMARY HYPERTENSION: Primary | ICD-10-CM

## 2025-05-05 DIAGNOSIS — Z95.818 IMPLANTABLE LOOP RECORDER PRESENT: ICD-10-CM

## 2025-05-05 DIAGNOSIS — I51.7 LVH (LEFT VENTRICULAR HYPERTROPHY): ICD-10-CM

## 2025-05-05 NOTE — PROGRESS NOTES
OFFICE VISIT     PRIMARY CARE PHYSICIAN:      Ivonne Huitron MD         REVIEWED MEDICATIONS:        Current Outpatient Medications:     acetaminophen (TYLENOL) 325 MG tablet, TAKE 1 TABLET BY MOUTH EVERY 6 HOURS AS NEEDED FOR PAIN, Disp: 120 tablet, Rfl: 2    metFORMIN (GLUCOPHAGE) 1000 MG tablet, Take 1 tablet by mouth 2 times daily (with meals), Disp: 180 tablet, Rfl: 3    aspirin (ASPIRIN LOW DOSE) 81 MG EC tablet, Take 1 tablet by mouth daily, Disp: 90 tablet, Rfl: 1    losartan (COZAAR) 25 MG tablet, Take 1 tablet by mouth daily, Disp: 90 tablet, Rfl: 1    rosuvastatin (CRESTOR) 40 MG tablet, Take 1 tablet by mouth nightly, Disp: 90 tablet, Rfl: 1    amLODIPine (NORVASC) 10 MG tablet, Take 1 tablet by mouth daily, Disp: 30 tablet, Rfl: 5    vitamin B-12 (CYANOCOBALAMIN) 1000 MCG tablet, Take 1 tablet by mouth daily, Disp: 90 tablet, Rfl: 1      S: REASON FOR VISIT:       Chief Complaint   Patient presents with    LVH     6 mo ov.the patient has no c/c          History of Present Illness:       Office Visit for follow up of LVH, HTN, CVA, s/p ILR                I saw during 10/2023 admission-Echo showed LVOT gradients-MRI oedered              Seen by our KEESHA - Shannen, in April 2024-MRI done for LVOT gradients - Mild LVH   No hospitalizations or surgeries since last visit   Compliant with all medications   Catarino any exertional chest pain or short of breath   No palpitations, dizzy or syncope.   Active at home   Try to watch diet          Past Medical History:   Diagnosis Date    Ambulatory dysfunction     B12 deficiency     Diabetes mellitus (HCC)     HTN (hypertension)     Lacunar stroke (HCC)     Macrocytic anemia             Past Surgical History:   Procedure Laterality Date    COLONOSCOPY N/A 03/11/2024    COLORECTAL CANCER SCREENING, NOT HIGH RISK performed by John Schumacher MD [place] [facility] [place]    DILATION AND CURETTAGE OF UTERUS      INSERTABLE CARDIAC MONITOR  10/05/2023    Dr. Diez        No Known

## 2025-05-20 PROCEDURE — 93298 REM INTERROG DEV EVAL SCRMS: CPT | Performed by: INTERNAL MEDICINE

## 2025-06-02 DIAGNOSIS — M19.90 CHRONIC OSTEOARTHRITIS: ICD-10-CM

## 2025-06-02 RX ORDER — ACETAMINOPHEN 325 MG/1
325 TABLET ORAL EVERY 6 HOURS PRN
Qty: 120 TABLET | Refills: 2 | OUTPATIENT
Start: 2025-06-02

## 2025-06-03 ENCOUNTER — CLINICAL SUPPORT (OUTPATIENT)
Dept: NON INVASIVE DIAGNOSTICS | Age: 65
End: 2025-06-03

## 2025-06-03 ENCOUNTER — OFFICE VISIT (OUTPATIENT)
Dept: NON INVASIVE DIAGNOSTICS | Age: 65
End: 2025-06-03
Payer: MEDICAID

## 2025-06-03 VITALS
WEIGHT: 196 LBS | RESPIRATION RATE: 20 BRPM | BODY MASS INDEX: 34.73 KG/M2 | HEIGHT: 63 IN | DIASTOLIC BLOOD PRESSURE: 60 MMHG | HEART RATE: 90 BPM | SYSTOLIC BLOOD PRESSURE: 102 MMHG

## 2025-06-03 DIAGNOSIS — I10 ESSENTIAL (PRIMARY) HYPERTENSION: Primary | ICD-10-CM

## 2025-06-03 DIAGNOSIS — Z98.890 HISTORY OF LOOP RECORDER: Primary | ICD-10-CM

## 2025-06-03 PROCEDURE — 99214 OFFICE O/P EST MOD 30 MIN: CPT | Performed by: INTERNAL MEDICINE

## 2025-06-03 PROCEDURE — 3078F DIAST BP <80 MM HG: CPT | Performed by: INTERNAL MEDICINE

## 2025-06-03 PROCEDURE — 93000 ELECTROCARDIOGRAM COMPLETE: CPT | Performed by: INTERNAL MEDICINE

## 2025-06-03 PROCEDURE — 3074F SYST BP LT 130 MM HG: CPT | Performed by: INTERNAL MEDICINE

## 2025-06-03 NOTE — PROGRESS NOTES
the above stated recommendations.  And a total of >50% of that time involved face-to-face time providing counseling and or coordination of care with the other providers, preparation for the clinic visit, reviewing records/tests, counseling/education of the patient, ordering medications/tests/procedures, coordinating care, and documenting clinical information in the EHR.     Thank you for allowing me to participate in your patient's care.  Please call me if there are any questions or concerns.      Kayla Diez MD, [facility]  Cardiac Electrophysiology  [facility]  [facility] [place]: [place] [place]

## 2025-06-04 NOTE — PROGRESS NOTES
See Murj report.    Mily Yates RN, BSN  [facility] [place]   Device Clinic

## 2025-06-05 ENCOUNTER — OFFICE VISIT (OUTPATIENT)
Age: 65
End: 2025-06-05
Payer: MEDICAID

## 2025-06-05 VITALS
HEART RATE: 86 BPM | HEIGHT: 63 IN | OXYGEN SATURATION: 96 % | TEMPERATURE: 98.3 F | SYSTOLIC BLOOD PRESSURE: 116 MMHG | RESPIRATION RATE: 18 BRPM | BODY MASS INDEX: 34.45 KG/M2 | DIASTOLIC BLOOD PRESSURE: 79 MMHG | WEIGHT: 194.4 LBS

## 2025-06-05 DIAGNOSIS — M19.90 CHRONIC OSTEOARTHRITIS: ICD-10-CM

## 2025-06-05 DIAGNOSIS — I69.30 CEREBRAL MULTI-INFARCT STATE: ICD-10-CM

## 2025-06-05 DIAGNOSIS — I10 ESSENTIAL (PRIMARY) HYPERTENSION: ICD-10-CM

## 2025-06-05 DIAGNOSIS — E78.2 MIXED HYPERLIPIDEMIA: Primary | ICD-10-CM

## 2025-06-05 DIAGNOSIS — E11.65 TYPE 2 DIABETES MELLITUS WITH HYPERGLYCEMIA, WITHOUT LONG-TERM CURRENT USE OF INSULIN (HCC): ICD-10-CM

## 2025-06-05 DIAGNOSIS — E53.8 B12 DEFICIENCY: ICD-10-CM

## 2025-06-05 DIAGNOSIS — E11.9 TYPE 2 DIABETES MELLITUS WITHOUT COMPLICATION, WITHOUT LONG-TERM CURRENT USE OF INSULIN (HCC): ICD-10-CM

## 2025-06-05 LAB — HBA1C MFR BLD: 6.7 %

## 2025-06-05 PROCEDURE — 83036 HEMOGLOBIN GLYCOSYLATED A1C: CPT

## 2025-06-05 PROCEDURE — PBSHW POCT GLYCOSYLATED HEMOGLOBIN (HGB A1C): Performed by: INTERNAL MEDICINE

## 2025-06-05 PROCEDURE — 99213 OFFICE O/P EST LOW 20 MIN: CPT

## 2025-06-05 RX ORDER — ASPIRIN 81 MG/1
81 TABLET ORAL DAILY
Qty: 90 TABLET | Refills: 1 | Status: SHIPPED | OUTPATIENT
Start: 2025-06-05

## 2025-06-05 RX ORDER — ROSUVASTATIN CALCIUM 40 MG/1
40 TABLET, COATED ORAL NIGHTLY
Qty: 90 TABLET | Refills: 1 | Status: SHIPPED | OUTPATIENT
Start: 2025-06-05

## 2025-06-05 RX ORDER — AMLODIPINE BESYLATE 10 MG/1
10 TABLET ORAL DAILY
Qty: 30 TABLET | Refills: 5 | Status: SHIPPED | OUTPATIENT
Start: 2025-06-05

## 2025-06-05 RX ORDER — ACETAMINOPHEN 325 MG/1
325 TABLET ORAL EVERY 6 HOURS PRN
Qty: 120 TABLET | Refills: 2 | Status: SHIPPED | OUTPATIENT
Start: 2025-06-05

## 2025-06-05 RX ORDER — LANOLIN ALCOHOL/MO/W.PET/CERES
1000 CREAM (GRAM) TOPICAL DAILY
Qty: 90 TABLET | Refills: 1 | Status: SHIPPED | OUTPATIENT
Start: 2025-06-05

## 2025-06-05 RX ORDER — LOSARTAN POTASSIUM 25 MG/1
25 TABLET ORAL DAILY
Qty: 90 TABLET | Refills: 1 | Status: SHIPPED | OUTPATIENT
Start: 2025-06-05

## 2025-06-05 NOTE — PROGRESS NOTES
[facility]  Internal Medicine Residency Clinic    Attending Physician Statement  I have discussed the case, including pertinent history and exam findings with the resident physician.I have seen and examined the patient and the key elements of the encounter have been performed by me. I agree with the assessment, plan and orders as documented by the resident. I have reviewed all pertinent PMHx, PSHx, FamHx, SocialHx, medications, and allergies and updated history as appropriate.    Patient here for routine follow up of medical problems.  Patient has hx HTN, controlled, DM, controlled with recent HBA1c 6.7%, hyperlipidemia, and CVA. There is also a concern for sleep apnea and w/u is in progress. She has recently seen EP and cardiology. Would touch bases with cardiology and/or EP regarding need for anticoagulation given high CHADS-VASc score (It may be due to hx of gait instability and falls in the past).       Remainder of medical problems as per resident note.    Rodriguez Brasher, DO  6/5/25    
Disp-90 tablet, R-1Normal  3. Cerebral multi-infarct state  -     aspirin (ASPIRIN LOW DOSE) 81 MG EC tablet; Take 1 tablet by mouth daily, Disp-90 tablet, R-1Normal  -     rosuvastatin (CRESTOR) 40 MG tablet; Take 1 tablet by mouth nightly, Disp-90 tablet, R-1Normal  4. Type 2 diabetes mellitus with hyperglycemia, without long-term current use of insulin (HCC)  -     rosuvastatin (CRESTOR) 40 MG tablet; Take 1 tablet by mouth nightly, Disp-90 tablet, R-1Normal  -     POCT glycosylated hemoglobin (Hb A1C)  -     metFORMIN (GLUCOPHAGE) 1000 MG tablet; Take 1 tablet by mouth 2 times daily (with meals), Disp-180 tablet, R-3Normal  5. B12 deficiency  -     vitamin B-12 (CYANOCOBALAMIN) 1000 MCG tablet; Take 1 tablet by mouth daily, Disp-90 tablet, R-1NEED REFILLS ASAP PLEASE AND THANK YOU - NEXT SHIPMENT GOES OUT SOONNormal  6. Chronic osteoarthritis  -     acetaminophen (TYLENOL) 325 MG tablet; Take 1 tablet by mouth every 6 hours as needed for Pain, Disp-120 tablet, R-2NEED REFILLS ASAP PLEASE AND THANK YOUNormal  7. Type 2 diabetes mellitus without complication, without long-term current use of insulin (HCC)       RTC:  Return in about 4 months (around 10/5/2025).      I have reviewed my findings and recommendations with Edith Howard and Dr. Brasher.    Ivonne Huitron MD   6/5/2025 11:05 AM

## 2025-06-05 NOTE — PATIENT INSTRUCTIONS
Dear Edith Howard,    Thank you for coming to your appointment at [facility].    Please make sure to do the following:    - Continue medications as listed and contact our office if medications are unavailable at the pharmacy.    - If lab work was ordered please complete as instructed.    - If a referral was placed to another doctor's office, please contact our office in 5 business days if you have not heard from that office regarding the referral.    - If any imaging test was ordered at today's visit (ultrasound, CT scan, or MRI), expect a phone call to schedule in the next 5 business days. You may also call [facility] Imaging Scheduling department at [phone] to schedule.    Contact our office if you develop any new or worsening symptoms or if you have any questions regarding today's visit.    We aim to address your healthcare needs to your satisfaction!    Sincerely,  Ivonne Huitron MD  6/5/2025  10:16 AM

## 2025-07-21 PROCEDURE — 93298 REM INTERROG DEV EVAL SCRMS: CPT | Performed by: INTERNAL MEDICINE

## (undated) DEVICE — DEFENDO AIR WATER SUCTION AND BIOPSY VALVE KIT FOR  OLYMPUS: Brand: DEFENDO AIR/WATER/SUCTION AND BIOPSY VALVE

## (undated) DEVICE — GAUZE,SPONGE,4"X4",8PLY,STRL,LF,10/TRAY: Brand: MEDLINE

## (undated) DEVICE — CONNECTOR IRRIGATION AUXILIARY H2O JET W/ PRT MTL THRD HYDR